# Patient Record
Sex: FEMALE | Race: WHITE | NOT HISPANIC OR LATINO | Employment: UNEMPLOYED | URBAN - METROPOLITAN AREA
[De-identification: names, ages, dates, MRNs, and addresses within clinical notes are randomized per-mention and may not be internally consistent; named-entity substitution may affect disease eponyms.]

---

## 2023-10-14 ENCOUNTER — HOSPITAL ENCOUNTER (EMERGENCY)
Facility: HOSPITAL | Age: 58
Discharge: HOME | End: 2023-10-14
Payer: COMMERCIAL

## 2023-10-14 VITALS
SYSTOLIC BLOOD PRESSURE: 159 MMHG | DIASTOLIC BLOOD PRESSURE: 97 MMHG | HEIGHT: 60 IN | TEMPERATURE: 97 F | OXYGEN SATURATION: 99 % | BODY MASS INDEX: 37.3 KG/M2 | WEIGHT: 190 LBS | RESPIRATION RATE: 20 BRPM | HEART RATE: 90 BPM

## 2023-10-14 DIAGNOSIS — T63.441A BEE STING, ACCIDENTAL OR UNINTENTIONAL, INITIAL ENCOUNTER: Primary | ICD-10-CM

## 2023-10-14 PROCEDURE — 99283 EMERGENCY DEPT VISIT LOW MDM: CPT

## 2023-10-14 RX ORDER — CETIRIZINE HYDROCHLORIDE 10 MG/1
10 TABLET ORAL DAILY
Qty: 10 TABLET | Refills: 0 | Status: SHIPPED | OUTPATIENT
Start: 2023-10-14 | End: 2023-10-24

## 2023-10-14 RX ORDER — CEPHALEXIN 500 MG/1
500 CAPSULE ORAL 4 TIMES DAILY
Qty: 28 CAPSULE | Refills: 0 | Status: SHIPPED | OUTPATIENT
Start: 2023-10-14 | End: 2023-10-21

## 2023-10-14 ASSESSMENT — COLUMBIA-SUICIDE SEVERITY RATING SCALE - C-SSRS
6. HAVE YOU EVER DONE ANYTHING, STARTED TO DO ANYTHING, OR PREPARED TO DO ANYTHING TO END YOUR LIFE?: NO
2. HAVE YOU ACTUALLY HAD ANY THOUGHTS OF KILLING YOURSELF?: NO
2. HAVE YOU ACTUALLY HAD ANY THOUGHTS OF KILLING YOURSELF?: NO
6. HAVE YOU EVER DONE ANYTHING, STARTED TO DO ANYTHING, OR PREPARED TO DO ANYTHING TO END YOUR LIFE?: NO
1. IN THE PAST MONTH, HAVE YOU WISHED YOU WERE DEAD OR WISHED YOU COULD GO TO SLEEP AND NOT WAKE UP?: NO
1. IN THE PAST MONTH, HAVE YOU WISHED YOU WERE DEAD OR WISHED YOU COULD GO TO SLEEP AND NOT WAKE UP?: NO

## 2023-10-15 NOTE — ED PROVIDER NOTES
Limitations to History: none  External Records Reviewed  Independent Historians: none  Social determinants affecting care: none    HPI  Chanel Vizcarra is a 58 y.o. female with history of hypertension who presents emergency department for assessment of a bee sting.  She reports that she was stung to her left breast.  She reports that she had immediate pain and redness to the area.  Reports that she became very anxious and came immediately to the ER.  She reports that she has had to bee stings in the past nebulous became cellulitic.  She reports that they region is very tender to the touch and very itchy.  She has not had any shortness of breath, tightness in her throat, difficulty swallowing secretions, nausea, vomiting, or headache.  She reports that she has an EpiPen at home but did not use this.  She did not try any over-the-counter medications for her symptoms.  She reports that she got very nervous and came to the ER for assessment.  Her son is at bedside.  She has no further complaints.    PMH  No past medical history on file. reviewed by myself.    Meds  Current Outpatient Medications   Medication Instructions    cephalexin (KEFLEX) 500 mg, oral, 4 times daily    cetirizine (ZYRTEC) 10 mg, oral, Daily       Allergies  No Known Allergies reviewed by myself.    SHx    reviewed by myself.      ------------------------------------------------------------------------------------------------------------------------------------------    BP (!) 159/97   Pulse 90   Temp 36.1 °C (97 °F)   Resp 20   Ht 1.524 m (5')   Wt 86.2 kg (190 lb)   SpO2 99%   BMI 37.11 kg/m²     Physical Exam  Vitals and nursing note reviewed.   Constitutional:       General: She is not in acute distress.     Appearance: Normal appearance. She is not toxic-appearing.   HENT:      Head: Normocephalic.      Nose: Nose normal.      Mouth/Throat:      Mouth: Mucous membranes are moist. No angioedema.      Pharynx: Oropharynx is clear. Uvula  midline. No pharyngeal swelling, posterior oropharyngeal erythema or uvula swelling.   Eyes:      Extraocular Movements: Extraocular movements intact.      Conjunctiva/sclera: Conjunctivae normal.   Cardiovascular:      Rate and Rhythm: Normal rate and regular rhythm.   Pulmonary:      Effort: Pulmonary effort is normal.      Breath sounds: Normal breath sounds.   Chest:      Comments: There is an insect sting to the inner region of the left breast.  There is no cellulitis or drainage.  Slight tenderness noted.  Musculoskeletal:         General: Normal range of motion.      Cervical back: Neck supple.   Skin:     General: Skin is warm and dry.   Neurological:      General: No focal deficit present.      Mental Status: She is alert and oriented to person, place, and time.   Psychiatric:         Attention and Perception: Attention normal.         Mood and Affect: Mood is anxious.          ------------------------------------------------------------------------------------------------------------------------------------------    No orders to display        Labs Reviewed - No data to display     Diagnoses as of 10/14/23 2240   Bee sting, accidental or unintentional, initial encounter        Medical Decision Making: She did not appear ill or toxic.  Vital signs reviewed.  She is hypertensive.  She is otherwise hemodynamically stable.  She is 99% on room air.  No signs of respiratory distress. No signs of anaphylaxis.  She does appear very anxious.  She is given concerned about her breast pain so an EKG was obtained.  I believe that her pain is likely from the bee sting.    EKG interpreted by myself: Normal sinus rhythm.  Ventricular rate 83 bpm.  PVC noted.  No acute ST elevations or depressions.    I discussed with the patient that I will prescribe her Zyrtec to take once daily for the bee sting.  I discussed with her to not itch the area as this can introduce infection.  She is very concerned about developing a  cellulitis so she was given a short course of Keflex to start if she develops any erythema, drainage or fevers.  She is to follow-up with her PCP in 2 to 3 days for reassessment.  She is to return to ER immediately if symptoms change or worsen.  She verbalized understanding and agreed to plan of care.  She was discharged home in stable condition.  EKG is not showing any acute signs of ischemia.  I believe that her breast pain is from the bee sting.    Diagnosis: Bee sting  Plan: discharge           Demario Teixeira PA-C  10/14/23 7634

## 2023-12-19 ENCOUNTER — HOSPITAL ENCOUNTER (OUTPATIENT)
Dept: CARDIOLOGY | Facility: HOSPITAL | Age: 58
Discharge: HOME | End: 2023-12-19
Payer: COMMERCIAL

## 2023-12-19 LAB
ATRIAL RATE: 83 BPM
P AXIS: 57 DEGREES
P OFFSET: 180 MS
P ONSET: 141 MS
PR INTERVAL: 160 MS
Q ONSET: 221 MS
QRS COUNT: 14 BEATS
QRS DURATION: 90 MS
QT INTERVAL: 456 MS
QTC CALCULATION(BAZETT): 535 MS
QTC FREDERICIA: 508 MS
R AXIS: 86 DEGREES
T AXIS: 89 DEGREES
T OFFSET: 449 MS
VENTRICULAR RATE: 83 BPM

## 2023-12-19 PROCEDURE — 93005 ELECTROCARDIOGRAM TRACING: CPT

## 2024-01-16 ENCOUNTER — OFFICE VISIT (OUTPATIENT)
Dept: PRIMARY CARE | Facility: CLINIC | Age: 59
End: 2024-01-16
Payer: COMMERCIAL

## 2024-01-16 VITALS
OXYGEN SATURATION: 98 % | DIASTOLIC BLOOD PRESSURE: 86 MMHG | HEIGHT: 60 IN | HEART RATE: 87 BPM | WEIGHT: 211 LBS | BODY MASS INDEX: 41.43 KG/M2 | SYSTOLIC BLOOD PRESSURE: 138 MMHG

## 2024-01-16 DIAGNOSIS — F43.29 POST-TRAUMA RESPONSE: Primary | ICD-10-CM

## 2024-01-16 DIAGNOSIS — F07.81 POST CONCUSSIVE SYNDROME: ICD-10-CM

## 2024-01-16 DIAGNOSIS — R41.3 MEMORY LOSS: ICD-10-CM

## 2024-01-16 DIAGNOSIS — G43.809 OTHER MIGRAINE WITHOUT STATUS MIGRAINOSUS, NOT INTRACTABLE: ICD-10-CM

## 2024-01-16 PROCEDURE — 99205 OFFICE O/P NEW HI 60 MIN: CPT | Performed by: INTERNAL MEDICINE

## 2024-01-16 RX ORDER — LOSARTAN POTASSIUM 50 MG/1
50 TABLET ORAL DAILY
COMMUNITY

## 2024-01-16 RX ORDER — LORAZEPAM 0.5 MG/1
0.5 TABLET ORAL EVERY 6 HOURS PRN
COMMUNITY

## 2024-01-16 RX ORDER — PANTOPRAZOLE SODIUM 40 MG/1
TABLET, DELAYED RELEASE ORAL
COMMUNITY
Start: 2023-11-09

## 2024-01-16 RX ORDER — MELOXICAM 15 MG/1
TABLET ORAL
COMMUNITY
Start: 2023-10-04

## 2024-01-16 RX ORDER — ALBUTEROL SULFATE 90 UG/1
AEROSOL, METERED RESPIRATORY (INHALATION)
COMMUNITY
Start: 2023-11-09

## 2024-01-16 RX ORDER — ROSUVASTATIN CALCIUM 10 MG/1
TABLET, COATED ORAL
COMMUNITY
Start: 2023-11-09

## 2024-01-16 RX ORDER — METHYLPREDNISOLONE 4 MG/1
TABLET ORAL
Qty: 21 TABLET | Refills: 2 | Status: SHIPPED | OUTPATIENT
Start: 2024-01-16 | End: 2024-01-23
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
## 2024-01-22 ENCOUNTER — OFFICE VISIT (OUTPATIENT)
Dept: BEHAVIORAL HEALTH | Facility: CLINIC | Age: 59
End: 2024-01-22
Payer: COMMERCIAL

## 2024-01-22 DIAGNOSIS — F43.29 POST-TRAUMA RESPONSE: ICD-10-CM

## 2024-01-22 DIAGNOSIS — F41.1 GAD (GENERALIZED ANXIETY DISORDER): ICD-10-CM

## 2024-01-22 PROCEDURE — 99205 OFFICE O/P NEW HI 60 MIN: CPT

## 2024-01-22 ASSESSMENT — ENCOUNTER SYMPTOMS
CONSTITUTIONAL NEGATIVE: 1
ENDOCRINE NEGATIVE: 1
EYES NEGATIVE: 1
RESPIRATORY NEGATIVE: 1
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
## 2024-01-31 RX ORDER — ESCITALOPRAM OXALATE 10 MG/1
5 TABLET ORAL DAILY
Qty: 30 TABLET | Refills: 0 | Status: SHIPPED
Start: 2024-01-31 | End: 2024-03-19 | Stop reason: ALTCHOICE

## 2024-01-31 ASSESSMENT — PATIENT HEALTH QUESTIONNAIRE - PHQ9
1. LITTLE INTEREST OR PLEASURE IN DOING THINGS: MORE THAN HALF THE DAYS
9. THOUGHTS THAT YOU WOULD BE BETTER OFF DEAD, OR OF HURTING YOURSELF: NOT AT ALL
7. TROUBLE CONCENTRATING ON THINGS, SUCH AS READING THE NEWSPAPER OR WATCHING TELEVISION: SEVERAL DAYS
5. POOR APPETITE OR OVEREATING: SEVERAL DAYS
3. TROUBLE FALLING OR STAYING ASLEEP OR SLEEPING TOO MUCH: NOT AT ALL
4. FEELING TIRED OR HAVING LITTLE ENERGY: SEVERAL DAYS
6. FEELING BAD ABOUT YOURSELF - OR THAT YOU ARE A FAILURE OR HAVE LET YOURSELF OR YOUR FAMILY DOWN: SEVERAL DAYS
2. FEELING DOWN, DEPRESSED OR HOPELESS: SEVERAL DAYS
8. MOVING OR SPEAKING SO SLOWLY THAT OTHER PEOPLE COULD HAVE NOTICED. OR THE OPPOSITE, BEING SO FIGETY OR RESTLESS THAT YOU HAVE BEEN MOVING AROUND A LOT MORE THAN USUAL: NOT AT ALL

## 2024-02-13 ENCOUNTER — APPOINTMENT (OUTPATIENT)
Dept: PRIMARY CARE | Facility: CLINIC | Age: 59
End: 2024-02-13
Payer: COMMERCIAL

## 2024-03-01 ENCOUNTER — APPOINTMENT (OUTPATIENT)
Dept: PRIMARY CARE | Facility: CLINIC | Age: 59
End: 2024-03-01
Payer: COMMERCIAL

## 2024-03-19 ENCOUNTER — OFFICE VISIT (OUTPATIENT)
Dept: PRIMARY CARE | Facility: CLINIC | Age: 59
End: 2024-03-19
Payer: COMMERCIAL

## 2024-03-19 VITALS
WEIGHT: 211 LBS | OXYGEN SATURATION: 95 % | SYSTOLIC BLOOD PRESSURE: 134 MMHG | HEIGHT: 60 IN | HEART RATE: 93 BPM | DIASTOLIC BLOOD PRESSURE: 80 MMHG | BODY MASS INDEX: 41.43 KG/M2

## 2024-03-19 DIAGNOSIS — F07.81 POST CONCUSSIVE SYNDROME: Primary | ICD-10-CM

## 2024-03-19 PROCEDURE — 1036F TOBACCO NON-USER: CPT | Performed by: INTERNAL MEDICINE

## 2024-03-19 PROCEDURE — 99214 OFFICE O/P EST MOD 30 MIN: CPT | Performed by: INTERNAL MEDICINE

## 2024-03-19 ASSESSMENT — PATIENT HEALTH QUESTIONNAIRE - PHQ9
2. FEELING DOWN, DEPRESSED OR HOPELESS: NOT AT ALL
SUM OF ALL RESPONSES TO PHQ9 QUESTIONS 1 AND 2: 0
1. LITTLE INTEREST OR PLEASURE IN DOING THINGS: NOT AT ALL
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
## 2024-04-18 ENCOUNTER — TELEMEDICINE (OUTPATIENT)
Dept: PRIMARY CARE | Facility: CLINIC | Age: 59
End: 2024-04-18
Payer: COMMERCIAL

## 2024-04-18 VITALS
DIASTOLIC BLOOD PRESSURE: 74 MMHG | BODY MASS INDEX: 41.43 KG/M2 | SYSTOLIC BLOOD PRESSURE: 127 MMHG | WEIGHT: 211 LBS | HEIGHT: 60 IN

## 2024-04-18 DIAGNOSIS — A08.4 GASTROENTERITIS AND COLITIS, VIRAL: Primary | ICD-10-CM

## 2024-04-18 PROCEDURE — 99441 PR PHYS/QHP TELEPHONE EVALUATION 5-10 MIN: CPT | Performed by: INTERNAL MEDICINE

## 2024-04-18 PROCEDURE — 1036F TOBACCO NON-USER: CPT | Performed by: INTERNAL MEDICINE

## 2024-04-18 ASSESSMENT — PATIENT HEALTH QUESTIONNAIRE - PHQ9
SUM OF ALL RESPONSES TO PHQ9 QUESTIONS 1 AND 2: 0
1. LITTLE INTEREST OR PLEASURE IN DOING THINGS: NOT AT ALL
2. FEELING DOWN, DEPRESSED OR HOPELESS: NOT AT ALL
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
## 2024-04-23 ENCOUNTER — LAB (OUTPATIENT)
Dept: LAB | Facility: LAB | Age: 59
End: 2024-04-23
Payer: COMMERCIAL

## 2024-04-23 DIAGNOSIS — I10 ESSENTIAL (PRIMARY) HYPERTENSION: ICD-10-CM

## 2024-04-23 DIAGNOSIS — Z00.00 ENCOUNTER FOR GENERAL ADULT MEDICAL EXAMINATION WITHOUT ABNORMAL FINDINGS: Primary | ICD-10-CM

## 2024-04-23 DIAGNOSIS — E78.2 MIXED HYPERLIPIDEMIA: ICD-10-CM

## 2024-04-23 LAB
ALBUMIN SERPL BCP-MCNC: 4.5 G/DL (ref 3.4–5)
ALP SERPL-CCNC: 79 U/L (ref 33–110)
ALT SERPL W P-5'-P-CCNC: 27 U/L (ref 7–45)
ANION GAP SERPL CALC-SCNC: 12 MMOL/L (ref 10–20)
AST SERPL W P-5'-P-CCNC: 21 U/L (ref 9–39)
BASOPHILS # BLD AUTO: 0.08 X10*3/UL (ref 0–0.1)
BASOPHILS NFR BLD AUTO: 0.8 %
BILIRUB SERPL-MCNC: 0.4 MG/DL (ref 0–1.2)
BUN SERPL-MCNC: 15 MG/DL (ref 6–23)
CALCIUM SERPL-MCNC: 9.6 MG/DL (ref 8.6–10.6)
CHLORIDE SERPL-SCNC: 106 MMOL/L (ref 98–107)
CHOLEST SERPL-MCNC: 252 MG/DL (ref 0–199)
CHOLESTEROL/HDL RATIO: 5
CO2 SERPL-SCNC: 27 MMOL/L (ref 21–32)
CREAT SERPL-MCNC: 0.82 MG/DL (ref 0.5–1.05)
EGFRCR SERPLBLD CKD-EPI 2021: 84 ML/MIN/1.73M*2
EOSINOPHIL # BLD AUTO: 0.19 X10*3/UL (ref 0–0.7)
EOSINOPHIL NFR BLD AUTO: 2 %
ERYTHROCYTE [DISTWIDTH] IN BLOOD BY AUTOMATED COUNT: 14.6 % (ref 11.5–14.5)
GLUCOSE SERPL-MCNC: 109 MG/DL (ref 74–99)
HCT VFR BLD AUTO: 44.2 % (ref 36–46)
HDLC SERPL-MCNC: 50.8 MG/DL
HGB BLD-MCNC: 13.7 G/DL (ref 12–16)
IMM GRANULOCYTES # BLD AUTO: 0.16 X10*3/UL (ref 0–0.7)
IMM GRANULOCYTES NFR BLD AUTO: 1.7 % (ref 0–0.9)
LDLC SERPL CALC-MCNC: 150 MG/DL
LYMPHOCYTES # BLD AUTO: 3.36 X10*3/UL (ref 1.2–4.8)
LYMPHOCYTES NFR BLD AUTO: 34.9 %
MCH RBC QN AUTO: 27.7 PG (ref 26–34)
MCHC RBC AUTO-ENTMCNC: 31 G/DL (ref 32–36)
MCV RBC AUTO: 90 FL (ref 80–100)
MONOCYTES # BLD AUTO: 0.66 X10*3/UL (ref 0.1–1)
MONOCYTES NFR BLD AUTO: 6.9 %
NEUTROPHILS # BLD AUTO: 5.18 X10*3/UL (ref 1.2–7.7)
NEUTROPHILS NFR BLD AUTO: 53.7 %
NON HDL CHOLESTEROL: 201 MG/DL (ref 0–149)
NRBC BLD-RTO: 0 /100 WBCS (ref 0–0)
PLATELET # BLD AUTO: 395 X10*3/UL (ref 150–450)
POTASSIUM SERPL-SCNC: 4 MMOL/L (ref 3.5–5.3)
PROT SERPL-MCNC: 7 G/DL (ref 6.4–8.2)
RBC # BLD AUTO: 4.94 X10*6/UL (ref 4–5.2)
SODIUM SERPL-SCNC: 141 MMOL/L (ref 136–145)
TRIGL SERPL-MCNC: 254 MG/DL (ref 0–149)
VLDL: 51 MG/DL (ref 0–40)
WBC # BLD AUTO: 9.6 X10*3/UL (ref 4.4–11.3)

## 2024-04-23 PROCEDURE — 80061 LIPID PANEL: CPT

## 2024-04-23 PROCEDURE — 85025 COMPLETE CBC W/AUTO DIFF WBC: CPT

## 2024-04-23 PROCEDURE — 36415 COLL VENOUS BLD VENIPUNCTURE: CPT

## 2024-04-23 PROCEDURE — 80053 COMPREHEN METABOLIC PANEL: CPT

## 2024-05-13 ENCOUNTER — APPOINTMENT (OUTPATIENT)
Dept: RADIOLOGY | Facility: HOSPITAL | Age: 59
End: 2024-05-13
Payer: COMMERCIAL

## 2024-05-13 ENCOUNTER — APPOINTMENT (OUTPATIENT)
Dept: CARDIOLOGY | Facility: HOSPITAL | Age: 59
End: 2024-05-13
Payer: COMMERCIAL

## 2024-05-13 ENCOUNTER — HOSPITAL ENCOUNTER (EMERGENCY)
Facility: HOSPITAL | Age: 59
Discharge: HOME | End: 2024-05-13
Attending: EMERGENCY MEDICINE
Payer: COMMERCIAL

## 2024-05-13 VITALS
DIASTOLIC BLOOD PRESSURE: 76 MMHG | SYSTOLIC BLOOD PRESSURE: 149 MMHG | WEIGHT: 200 LBS | TEMPERATURE: 97.5 F | BODY MASS INDEX: 39.27 KG/M2 | HEIGHT: 60 IN | HEART RATE: 76 BPM | OXYGEN SATURATION: 97 % | RESPIRATION RATE: 18 BRPM

## 2024-05-13 DIAGNOSIS — R07.9 CHEST PAIN, UNSPECIFIED TYPE: Primary | ICD-10-CM

## 2024-05-13 LAB
ALBUMIN SERPL BCP-MCNC: 4.2 G/DL (ref 3.4–5)
ALP SERPL-CCNC: 77 U/L (ref 33–110)
ALT SERPL W P-5'-P-CCNC: 26 U/L (ref 7–45)
ANION GAP SERPL CALC-SCNC: 13 MMOL/L (ref 10–20)
AST SERPL W P-5'-P-CCNC: 18 U/L (ref 9–39)
BASOPHILS # BLD AUTO: 0.07 X10*3/UL (ref 0–0.1)
BASOPHILS NFR BLD AUTO: 0.7 %
BILIRUB SERPL-MCNC: 0.4 MG/DL (ref 0–1.2)
BUN SERPL-MCNC: 14 MG/DL (ref 6–23)
CALCIUM SERPL-MCNC: 9.2 MG/DL (ref 8.6–10.3)
CARDIAC TROPONIN I PNL SERPL HS: 10 NG/L (ref 0–13)
CARDIAC TROPONIN I PNL SERPL HS: 9 NG/L (ref 0–13)
CHLORIDE SERPL-SCNC: 104 MMOL/L (ref 98–107)
CO2 SERPL-SCNC: 26 MMOL/L (ref 21–32)
CREAT SERPL-MCNC: 0.94 MG/DL (ref 0.5–1.05)
EGFRCR SERPLBLD CKD-EPI 2021: 71 ML/MIN/1.73M*2
EOSINOPHIL # BLD AUTO: 0.26 X10*3/UL (ref 0–0.7)
EOSINOPHIL NFR BLD AUTO: 2.8 %
ERYTHROCYTE [DISTWIDTH] IN BLOOD BY AUTOMATED COUNT: 14 % (ref 11.5–14.5)
GLUCOSE SERPL-MCNC: 138 MG/DL (ref 74–99)
HCT VFR BLD AUTO: 42.1 % (ref 36–46)
HGB BLD-MCNC: 13.5 G/DL (ref 12–16)
IMM GRANULOCYTES # BLD AUTO: 0.1 X10*3/UL (ref 0–0.7)
IMM GRANULOCYTES NFR BLD AUTO: 1.1 % (ref 0–0.9)
LYMPHOCYTES # BLD AUTO: 3.89 X10*3/UL (ref 1.2–4.8)
LYMPHOCYTES NFR BLD AUTO: 41.5 %
MCH RBC QN AUTO: 28.2 PG (ref 26–34)
MCHC RBC AUTO-ENTMCNC: 32.1 G/DL (ref 32–36)
MCV RBC AUTO: 88 FL (ref 80–100)
MONOCYTES # BLD AUTO: 0.59 X10*3/UL (ref 0.1–1)
MONOCYTES NFR BLD AUTO: 6.3 %
NEUTROPHILS # BLD AUTO: 4.46 X10*3/UL (ref 1.2–7.7)
NEUTROPHILS NFR BLD AUTO: 47.6 %
NRBC BLD-RTO: 0 /100 WBCS (ref 0–0)
PLATELET # BLD AUTO: 373 X10*3/UL (ref 150–450)
POTASSIUM SERPL-SCNC: 3.7 MMOL/L (ref 3.5–5.3)
PROT SERPL-MCNC: 6.9 G/DL (ref 6.4–8.2)
RBC # BLD AUTO: 4.78 X10*6/UL (ref 4–5.2)
SODIUM SERPL-SCNC: 139 MMOL/L (ref 136–145)
WBC # BLD AUTO: 9.4 X10*3/UL (ref 4.4–11.3)

## 2024-05-13 PROCEDURE — 36415 COLL VENOUS BLD VENIPUNCTURE: CPT | Performed by: EMERGENCY MEDICINE

## 2024-05-13 PROCEDURE — 93005 ELECTROCARDIOGRAM TRACING: CPT | Mod: 59

## 2024-05-13 PROCEDURE — 84075 ASSAY ALKALINE PHOSPHATASE: CPT | Performed by: EMERGENCY MEDICINE

## 2024-05-13 PROCEDURE — 99283 EMERGENCY DEPT VISIT LOW MDM: CPT | Mod: 25

## 2024-05-13 PROCEDURE — 85025 COMPLETE CBC W/AUTO DIFF WBC: CPT | Performed by: EMERGENCY MEDICINE

## 2024-05-13 PROCEDURE — 71045 X-RAY EXAM CHEST 1 VIEW: CPT

## 2024-05-13 PROCEDURE — 71045 X-RAY EXAM CHEST 1 VIEW: CPT | Performed by: RADIOLOGY

## 2024-05-13 PROCEDURE — 84484 ASSAY OF TROPONIN QUANT: CPT | Performed by: EMERGENCY MEDICINE

## 2024-05-13 ASSESSMENT — PAIN SCALES - GENERAL
PAINLEVEL_OUTOF10: 0 - NO PAIN
PAINLEVEL_OUTOF10: 3

## 2024-05-13 ASSESSMENT — PAIN - FUNCTIONAL ASSESSMENT
PAIN_FUNCTIONAL_ASSESSMENT: 0-10
PAIN_FUNCTIONAL_ASSESSMENT: 0-10

## 2024-05-13 ASSESSMENT — LIFESTYLE VARIABLES
EVER HAD A DRINK FIRST THING IN THE MORNING TO STEADY YOUR NERVES TO GET RID OF A HANGOVER: NO
TOTAL SCORE: 0
EVER FELT BAD OR GUILTY ABOUT YOUR DRINKING: NO
HAVE YOU EVER FELT YOU SHOULD CUT DOWN ON YOUR DRINKING: NO
HAVE PEOPLE ANNOYED YOU BY CRITICIZING YOUR DRINKING: NO

## 2024-05-13 ASSESSMENT — PAIN DESCRIPTION - LOCATION: LOCATION: CHEST

## 2024-05-13 ASSESSMENT — PAIN DESCRIPTION - PAIN TYPE: TYPE: ACUTE PAIN

## 2024-05-13 ASSESSMENT — COLUMBIA-SUICIDE SEVERITY RATING SCALE - C-SSRS
1. IN THE PAST MONTH, HAVE YOU WISHED YOU WERE DEAD OR WISHED YOU COULD GO TO SLEEP AND NOT WAKE UP?: NO
6. HAVE YOU EVER DONE ANYTHING, STARTED TO DO ANYTHING, OR PREPARED TO DO ANYTHING TO END YOUR LIFE?: NO
2. HAVE YOU ACTUALLY HAD ANY THOUGHTS OF KILLING YOURSELF?: NO

## 2024-05-13 NOTE — ED PROVIDER NOTES
HPI   Chief Complaint   Patient presents with    Chest Pain     Left arm tingling, started last night        Patient presents for chest discomfort is been going on for approximately 12 hours.  It is intermittent and never lasts more than a minute.  She states it feels like a warmth or squeezing also may be sometimes a burning as well.  And then it will completely go away.  Is not exertional and is not pleuritic.  She has no other associated symptoms.  She does have a history of high blood pressure and hyperlipidemia and obesity.                          Collette Coma Scale Score: 15                     Patient History   No past medical history on file.  No past surgical history on file.  Family History   Problem Relation Name Age of Onset    Other (Suicide) Cousin       Social History     Tobacco Use    Smoking status: Never    Smokeless tobacco: Never   Substance Use Topics    Alcohol use: Not Currently    Drug use: Never       Physical Exam   ED Triage Vitals [05/13/24 0318]   Temperature Heart Rate Respirations BP   36.1 °C (97 °F) 84 16 (!) 178/108      Pulse Ox Temp src Heart Rate Source Patient Position   98 % -- -- --      BP Location FiO2 (%)     -- --       Physical Exam  Vitals and nursing note reviewed.   Constitutional:       General: She is not in acute distress.     Appearance: She is well-developed.   HENT:      Head: Normocephalic and atraumatic.   Eyes:      Conjunctiva/sclera: Conjunctivae normal.   Cardiovascular:      Rate and Rhythm: Normal rate and regular rhythm.      Heart sounds: No murmur heard.  Pulmonary:      Effort: Pulmonary effort is normal. No respiratory distress.      Breath sounds: Normal breath sounds.   Abdominal:      Palpations: Abdomen is soft.      Tenderness: There is no abdominal tenderness.   Musculoskeletal:         General: No swelling.      Cervical back: Neck supple.   Skin:     General: Skin is warm and dry.      Capillary Refill: Capillary refill takes less than 2  seconds.   Neurological:      Mental Status: She is alert.   Psychiatric:         Mood and Affect: Mood normal.         ED Course & MDM   Diagnoses as of 05/13/24 2124   Chest pain, unspecified type       Medical Decision Making  Twelve-lead EKG is normal sinus rhythm with a rate of 90.  Nonspecific ST-T wave changes.  No ischemia or injury pattern.  It was interpreted by emergency physician.    Patient had troponins that were negative x 2.  Chest x-ray is unremarkable.  The likelihood of acute coronary syndrome, aortic dissection or pulmonary embolism was extremely low.  Patient is currently asymptomatic.  She will be discharged.  Prior medical records were reviewed.        Procedure  Procedures     Zeke Coffey MD  05/13/24 2125

## 2024-05-22 LAB
ATRIAL RATE: 75 BPM
ATRIAL RATE: 89 BPM
P AXIS: 33 DEGREES
P AXIS: 52 DEGREES
P OFFSET: 189 MS
P ONSET: 143 MS
PR INTERVAL: 146 MS
PR INTERVAL: 164 MS
Q ONSET: 225 MS
Q ONSET: 251 MS
QRS COUNT: 12 BEATS
QRS COUNT: 14 BEATS
QRS DURATION: 102 MS
QRS DURATION: 92 MS
QT INTERVAL: 388 MS
QT INTERVAL: 392 MS
QTC CALCULATION(BAZETT): 437 MS
QTC CALCULATION(BAZETT): 475 MS
QTC FREDERICIA: 422 MS
QTC FREDERICIA: 444 MS
R AXIS: 59 DEGREES
R AXIS: 83 DEGREES
T AXIS: 100 DEGREES
T AXIS: 3 DEGREES
T OFFSET: 421 MS
T OFFSET: 445 MS
VENTRICULAR RATE: 75 BPM
VENTRICULAR RATE: 90 BPM

## 2024-06-21 ENCOUNTER — TELEMEDICINE (OUTPATIENT)
Dept: PRIMARY CARE | Facility: CLINIC | Age: 59
End: 2024-06-21
Payer: COMMERCIAL

## 2024-06-21 DIAGNOSIS — R07.9 CHEST PAIN, UNSPECIFIED TYPE: ICD-10-CM

## 2024-06-21 DIAGNOSIS — L98.9 SKIN LESION: ICD-10-CM

## 2024-06-21 DIAGNOSIS — Z00.00 HEALTHCARE MAINTENANCE: Primary | ICD-10-CM

## 2024-06-21 DIAGNOSIS — Z12.11 COLON CANCER SCREENING: ICD-10-CM

## 2024-06-21 PROCEDURE — 99442 PR PHYS/QHP TELEPHONE EVALUATION 11-20 MIN: CPT | Performed by: INTERNAL MEDICINE

## 2024-06-21 PROCEDURE — 1036F TOBACCO NON-USER: CPT | Performed by: INTERNAL MEDICINE
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
## 2024-07-17 ENCOUNTER — HOSPITAL ENCOUNTER (EMERGENCY)
Facility: HOSPITAL | Age: 59
Discharge: HOME | End: 2024-07-17
Payer: COMMERCIAL

## 2024-07-17 VITALS
RESPIRATION RATE: 20 BRPM | TEMPERATURE: 97.7 F | OXYGEN SATURATION: 99 % | HEART RATE: 75 BPM | WEIGHT: 200 LBS | HEIGHT: 60 IN | SYSTOLIC BLOOD PRESSURE: 169 MMHG | DIASTOLIC BLOOD PRESSURE: 100 MMHG | BODY MASS INDEX: 39.27 KG/M2

## 2024-07-17 DIAGNOSIS — T16.2XXA FOREIGN BODY OF LEFT EAR, INITIAL ENCOUNTER: Primary | ICD-10-CM

## 2024-07-17 PROCEDURE — 99282 EMERGENCY DEPT VISIT SF MDM: CPT | Mod: 25

## 2024-07-17 PROCEDURE — 69200 CLEAR OUTER EAR CANAL: CPT | Mod: LT

## 2024-07-17 ASSESSMENT — LIFESTYLE VARIABLES
HAVE YOU EVER FELT YOU SHOULD CUT DOWN ON YOUR DRINKING: NO
EVER HAD A DRINK FIRST THING IN THE MORNING TO STEADY YOUR NERVES TO GET RID OF A HANGOVER: NO
EVER FELT BAD OR GUILTY ABOUT YOUR DRINKING: NO
TOTAL SCORE: 0
HAVE PEOPLE ANNOYED YOU BY CRITICIZING YOUR DRINKING: NO

## 2024-07-17 ASSESSMENT — COLUMBIA-SUICIDE SEVERITY RATING SCALE - C-SSRS
6. HAVE YOU EVER DONE ANYTHING, STARTED TO DO ANYTHING, OR PREPARED TO DO ANYTHING TO END YOUR LIFE?: NO
1. IN THE PAST MONTH, HAVE YOU WISHED YOU WERE DEAD OR WISHED YOU COULD GO TO SLEEP AND NOT WAKE UP?: NO
2. HAVE YOU ACTUALLY HAD ANY THOUGHTS OF KILLING YOURSELF?: NO

## 2024-07-17 NOTE — ED PROVIDER NOTES
Limitations to History: None     HPI:      Chanel Vizcarra is a 58 y.o. with significant past medical history for hypertension presented to ED today from home with spouse for removal of foreign body left EAC.  Just prior to arrival, the patient was using an off brand of Q-tips, she feels some of the cotton is stuck in the ear canal.  Reports muffled hearing and uncomfortable feeling.  Denies fever/chills, cough/cold symptoms, chest pain, shortness of breath, nausea/vomiting, abdominal pain, urinary symptoms, change in bowel habits or any other complaints.  No smoking, EtOH or drug use.  PCP is Dr. Quiroga.     Additional History Obtained from: None    ------------------------------------------------------------------------------------------------------------------------------------------    VS: As documented in the triage note and EMR flowsheet from this visit were reviewed.    Physical Exam:  Gen: 58-year-old  female, awake and alert, oriented x 3.  Well-nourished and hydrated.  No apparent distress.  Head/Neck: NCAT, neck w/ FROM  Eyes: EOMI, PERRL, anicteric sclerae, noninjected conjunctivae  Ears: Foreign body left EAC, right TM clear b/l without sign of infection  Nose: Nares patent w/o rhinorrhea  Mouth:  MMM, no OP lesions noted  Neurologic: Alert, symmetrical facies, phonates clearly, moves all extremities equally, responsive to touch, ambulates normally   Skin: Pink, warm and dry.  No rashes noted        ------------------------------------------------------------------------------------------------------------------------------------------    Medical Decision Makin-year-old female with known hypertension is evaluated the bedside for cotton that is stuck in the left EAC after attempting to clean areas prior to arrival.  On arrival to the ED, blood pressure slightly elevated 195/85, this will be reevaluated.  Patient is not tachycardic, hypoxic or febrile.  Cotton visible in the left EAC.       1044: Easily removed with alligator forceps.  Left TM intact with visible landmarks.  No evidence of infection or trauma.  Repeat vital signs within normal limits.  Feels improved.  Discharge home, follow-up with PCP as needed.  Counseled on putting things inside her ears.  Return precautions discussed.  Diagnosis, treatment and plan discussed with patient, she verbalizes understanding and is in agreement.  Condition stable for discharge.    Diagnoses as of 07/17/24 1021   Foreign body of left ear, initial encounter       EKG interpreted by myself (ED attending physician): Not ordered    Chronic Medical Conditions Significantly Affecting Care: None     External Records Reviewed: I reviewed recent and relevant outside records including: None    Discussion of Management with Other Providers: None    I discussed the patient/results with: None       YUE Ramey-SUMMER  07/17/24 1046

## 2024-07-17 NOTE — DISCHARGE INSTRUCTIONS
Cotton was successfully removed from your left external ear canal.  You achieved relief of symptoms.  Avoid putting things in your ears.  Follow-up with PCP as needed.

## 2024-07-23 ENCOUNTER — HOSPITAL ENCOUNTER (OUTPATIENT)
Dept: CARDIOLOGY | Facility: HOSPITAL | Age: 59
Discharge: HOME | End: 2024-07-23
Payer: COMMERCIAL

## 2024-07-23 VITALS — HEIGHT: 60 IN | BODY MASS INDEX: 39.27 KG/M2 | WEIGHT: 200 LBS

## 2024-07-23 DIAGNOSIS — R07.9 CHEST PAIN, UNSPECIFIED TYPE: ICD-10-CM

## 2024-07-23 LAB
AORTIC VALVE MEAN GRADIENT: 5 MMHG
AORTIC VALVE PEAK VELOCITY: 1.45 M/S
AV PEAK GRADIENT: 8.4 MMHG
AVA (PEAK VEL): 2.04 CM2
AVA (VTI): 2.1 CM2
EJECTION FRACTION APICAL 4 CHAMBER: 54.7
EJECTION FRACTION: 56 %
LEFT ATRIUM VOLUME AREA LENGTH INDEX BSA: 19.5 ML/M2
LEFT VENTRICLE INTERNAL DIMENSION DIASTOLE: 3.6 CM (ref 3.5–6)
LEFT VENTRICULAR OUTFLOW TRACT DIAMETER: 2 CM
MITRAL VALVE E/E' RATIO: 11.9
RIGHT VENTRICLE FREE WALL PEAK S': 10.9 CM/S
TRICUSPID ANNULAR PLANE SYSTOLIC EXCURSION: 1.8 CM

## 2024-07-23 PROCEDURE — 93306 TTE W/DOPPLER COMPLETE: CPT

## 2024-07-23 PROCEDURE — 93306 TTE W/DOPPLER COMPLETE: CPT | Performed by: INTERNAL MEDICINE

## 2024-07-23 PROCEDURE — 93016 CV STRESS TEST SUPVJ ONLY: CPT | Performed by: INTERNAL MEDICINE

## 2024-07-23 PROCEDURE — 93017 CV STRESS TEST TRACING ONLY: CPT

## 2024-07-23 PROCEDURE — 93018 CV STRESS TEST I&R ONLY: CPT | Performed by: INTERNAL MEDICINE

## 2024-07-29 ENCOUNTER — APPOINTMENT (OUTPATIENT)
Dept: PRIMARY CARE | Facility: CLINIC | Age: 59
End: 2024-07-29
Payer: COMMERCIAL

## 2024-07-29 VITALS — WEIGHT: 214 LBS | SYSTOLIC BLOOD PRESSURE: 132 MMHG | DIASTOLIC BLOOD PRESSURE: 88 MMHG | BODY MASS INDEX: 41.79 KG/M2

## 2024-07-29 DIAGNOSIS — K21.9 GASTROESOPHAGEAL REFLUX DISEASE, UNSPECIFIED WHETHER ESOPHAGITIS PRESENT: ICD-10-CM

## 2024-07-29 DIAGNOSIS — Z00.00 HEALTHCARE MAINTENANCE: ICD-10-CM

## 2024-07-29 DIAGNOSIS — Z97.5 IUD (INTRAUTERINE DEVICE) IN PLACE: ICD-10-CM

## 2024-07-29 DIAGNOSIS — Z12.31 VISIT FOR SCREENING MAMMOGRAM: Primary | ICD-10-CM

## 2024-07-29 DIAGNOSIS — I10 PRIMARY HYPERTENSION: ICD-10-CM

## 2024-07-29 PROCEDURE — 99215 OFFICE O/P EST HI 40 MIN: CPT | Performed by: INTERNAL MEDICINE

## 2024-07-29 PROCEDURE — 3075F SYST BP GE 130 - 139MM HG: CPT | Performed by: INTERNAL MEDICINE

## 2024-07-29 PROCEDURE — 1036F TOBACCO NON-USER: CPT | Performed by: INTERNAL MEDICINE

## 2024-07-29 PROCEDURE — 3079F DIAST BP 80-89 MM HG: CPT | Performed by: INTERNAL MEDICINE

## 2024-07-29 RX ORDER — PANTOPRAZOLE SODIUM 40 MG/1
40 TABLET, DELAYED RELEASE ORAL
Qty: 90 TABLET | Refills: 1 | Status: SHIPPED | OUTPATIENT
Start: 2024-07-29 | End: 2024-10-27

## 2024-07-29 RX ORDER — LOSARTAN POTASSIUM 50 MG/1
100 TABLET ORAL DAILY
Qty: 90 TABLET | Refills: 1 | Status: SHIPPED | OUTPATIENT
Start: 2024-07-29
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
## 2024-08-02 ENCOUNTER — TELEPHONE (OUTPATIENT)
Dept: PRIMARY CARE | Facility: CLINIC | Age: 59
End: 2024-08-02
Payer: COMMERCIAL

## 2024-08-02 ENCOUNTER — DOCUMENTATION (OUTPATIENT)
Dept: PRIMARY CARE | Facility: CLINIC | Age: 59
End: 2024-08-02
Payer: COMMERCIAL

## 2024-08-02 NOTE — PROGRESS NOTES
Chanel  I am not clear why you would need a work letter claiming inability to work based on your last appointment  , you seem to be doing reasonably well and this was not discussed.  Please advise on what your symptoms are that you believe make it so you cannot work at this time, and then it will be decided if a letter stating you cannot work is medically indicated and or if you need other referrals placed the specialist so that he can get back to feeling her best  Also if you have further questions please schedule another office follow-up with me in the next 2 weeks  Thank you      Radha Gaffney routed conversation to You5 hours ago (11:59 AM)     Radha Gaffney5 hours ago (11:59 AM)     LM  Pt would like letter stating she cannot work until further notice         Note        Chanel Vizcarra [phone]  Radha Gaffney5 hours ago (11:58 AM)

## 2024-08-05 ENCOUNTER — APPOINTMENT (OUTPATIENT)
Dept: CARDIOLOGY | Facility: CLINIC | Age: 59
End: 2024-08-05
Payer: COMMERCIAL

## 2024-08-28 ENCOUNTER — TELEMEDICINE (OUTPATIENT)
Dept: PRIMARY CARE | Facility: CLINIC | Age: 59
End: 2024-08-28
Payer: COMMERCIAL

## 2024-08-28 VITALS — WEIGHT: 214 LBS | BODY MASS INDEX: 41.79 KG/M2

## 2024-08-28 DIAGNOSIS — R41.3 MEMORY LOSS: ICD-10-CM

## 2024-08-28 DIAGNOSIS — F07.81 POST CONCUSSION SYNDROME: ICD-10-CM

## 2024-08-28 DIAGNOSIS — R47.9 SPEECH DISTURBANCE, UNSPECIFIED TYPE: Primary | ICD-10-CM

## 2024-08-28 DIAGNOSIS — F41.9 ANXIETY: ICD-10-CM

## 2024-08-28 DIAGNOSIS — R42 VERTIGO: ICD-10-CM

## 2024-08-28 PROCEDURE — 1036F TOBACCO NON-USER: CPT | Performed by: INTERNAL MEDICINE

## 2024-08-28 PROCEDURE — 99442 PR PHYS/QHP TELEPHONE EVALUATION 11-20 MIN: CPT | Performed by: INTERNAL MEDICINE
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
## 2024-08-28 NOTE — LETTER
August 28, 2024     Patient: Chanel Vizcarra   YOB: 1965   Date of Visit:        To Whom It May Concern:    Chanel Vizcarra was seen in my clinic on 8/28/2024 at 3:30 pm. Patient is unable to work at this time until further notice for medical reasons.       Sincerely,         Juve Quiroga,         CC: No Recipients

## 2024-09-12 ENCOUNTER — HOSPITAL ENCOUNTER (OUTPATIENT)
Dept: RADIOLOGY | Facility: CLINIC | Age: 59
Discharge: HOME | End: 2024-09-12
Payer: COMMERCIAL

## 2024-09-12 VITALS — HEIGHT: 60 IN | WEIGHT: 214.07 LBS | BODY MASS INDEX: 42.03 KG/M2

## 2024-09-12 DIAGNOSIS — Z12.31 VISIT FOR SCREENING MAMMOGRAM: ICD-10-CM

## 2024-09-12 PROCEDURE — 77067 SCR MAMMO BI INCL CAD: CPT

## 2024-09-12 PROCEDURE — 77067 SCR MAMMO BI INCL CAD: CPT | Performed by: RADIOLOGY

## 2024-09-12 PROCEDURE — 77063 BREAST TOMOSYNTHESIS BI: CPT | Performed by: RADIOLOGY

## 2024-10-01 ENCOUNTER — EVALUATION (OUTPATIENT)
Dept: SPEECH THERAPY | Facility: CLINIC | Age: 59
End: 2024-10-01
Payer: COMMERCIAL

## 2024-10-01 DIAGNOSIS — R41.841 COGNITIVE COMMUNICATION DEFICIT: Primary | ICD-10-CM

## 2024-10-01 DIAGNOSIS — R47.9 SPEECH DISTURBANCE, UNSPECIFIED TYPE: ICD-10-CM

## 2024-10-01 PROCEDURE — 96125 COGNITIVE TEST BY HC PRO: CPT | Mod: GN | Performed by: SPEECH-LANGUAGE PATHOLOGIST

## 2024-10-01 ASSESSMENT — PAIN SCALES - GENERAL: PAINLEVEL_OUTOF10: 0 - NO PAIN

## 2024-10-01 ASSESSMENT — PAIN - FUNCTIONAL ASSESSMENT: PAIN_FUNCTIONAL_ASSESSMENT: 0-10

## 2024-10-01 NOTE — PROGRESS NOTES
"Speech-Language Pathology  Adult Outpatient Speech-Language and Cognitive Assessment    Patient Name: Chanel Vizcarra  MRN: [id number]  : 1965  Today's Date: 10/01/24  Time Calculation  Start Time: 1045  Stop Time: 1145  Time Calculation (min): 60 min        Current Problem:   Cognitive communication deficit    SLP Assessment:    Chanel Vizcarra will benefit from skilled speech therapy at this time to address above deficits. Pt participated in the CLQT this date in which she was observed to have a mild cognitive deficits in memory and language. However, pt stated that she is not at her PLOF.  Pt stated she has seen a decrease in executive function and attention which has increased difficulty for ADLs.    Related Medical History:   Per doctor report, \" Past medical history hyperlipidemia hypertension concussion gerd   ER presentation May 13, 2024 chest pain EKG negative troponin negative  ER presentation 2024 foreign body ear     Patient states since her concussion over year agp feel somewhat foggy in the brain grade 3 out of 10 worse with the concussion little bit better over time  Memory not as good as it was  Abnormal itching sensation top of the right scalp where she had the trauma feels like it comes from the inside she states  Denies headaches focal weakness paresthesias vision changes.\"    SLP Plan:  Chanel Vizcarra is recommended to be seen for Skilled Speech Therapy 1 time per week for 16 weeks.  This was reviewed with family and they are in agreement of this.     Goals:  By discharge Chanel Vizcarra will:    LTGs:  Pt will increase cognitive linguistic skills to be able to fully participate in daily tasks without deficit at PLOF.    STGs:  Patient will complete divided attention tasks with 80% accuracy independently.  Patient will demonstrate an improvement in short term memory abilities by recalling 5-7 pieces of information each session.   Patient will recall 5-7 pieces of information " presented in verbal or written form, with/without a delay and with/without distraction with 90% accuracy when provided with moderate visual and verbal cueing.   Patient will be able to complete high level problem solving tasks (e.g. divergent/convergent tasks, sequencing, reasoning, etc.) at 80% with mod cues.   Patient will complete executive function tasks (e.g. path finding, prescription labels, etc) with 80% accuracy following min to mod verbal cues.  Patient will learn and use memory/cognitive compensatory strategies with return demo with 90% accuracy.  Pt will be use word finding strategies with 80% accuracy following min verbal cues.  Patient/Family education to be provided each session.        Consider referral to: Neuro Ophthalmologist  Prognosis: Good  Factors that may affect progress: None, Cognition, Communication, Cultural, Emotional, Family/Caregiver Support, Financial, Language, Motivation, Caodaism, Other.  Pt/family agrees to the reviewed goals and Plan for therapy.    Plan of care was developed with input and agreement by the pt/family.    Subjective:   Chanel MARTÍNEZ Harris was seen 1-on-1. Pt participated well throughout the session.           Date of Onset: 9/4/2023     Chief Complaint: Memory and word finding abilities  Prior Level of Function: WNL  Previous Therapies: None  Respiratory Status: Room air  Hearing: WNL  Vision: Glasses for reading  No overt symptoms/signs of abuse/neglect.   Precautions: None  Pt/family prefer to learn via demonstration, printed materials, performance, and explanation/discussion.    Pain:  Pain Assessment: 0-10   0-10 (Numeric) Pain Score: 0 - No pain    Insurance:  Reviewed: Yes  Number of Authorized Visits: Number of Authorized Treatments : Need auth after eval  Total Number of Visits:    Certification Period:   Ending:     Number of Authorized Treatments : Need auth after eval          Objective:  Portions of the Cognitive Linguistic Quick Test + (CLQT+) were  "administered this date.  The pt scored as follows:  Domains:  Attention: 192   Memory: 141   Executive Functions: 30  Language: 28  Visuospatial Skills: 95  Clock Drawin    This indicates a border-line \"mild\" cognitive deficit.  Pt demonstrated the most difficulty with memory and language tasks.  Although pt scored \"WNL\" for the other domains, pt stated she is not currently at her PLOF and is impacting her ability to complete ADLs.       Treatment Provided:   None provided    Outpatient Education:   Reviewed results of today's assessment.    Reviewed plan for Therapy and Chanel Vizcarra and family were in agreement of this.   Recommend further follow up with PT.          "

## 2024-10-08 ENCOUNTER — APPOINTMENT (OUTPATIENT)
Dept: SPEECH THERAPY | Facility: CLINIC | Age: 59
End: 2024-10-08
Payer: COMMERCIAL

## 2024-10-09 ENCOUNTER — APPOINTMENT (OUTPATIENT)
Dept: PRIMARY CARE | Facility: CLINIC | Age: 59
End: 2024-10-09
Payer: COMMERCIAL

## 2024-10-14 ENCOUNTER — APPOINTMENT (OUTPATIENT)
Dept: OBSTETRICS AND GYNECOLOGY | Facility: CLINIC | Age: 59
End: 2024-10-14
Payer: COMMERCIAL

## 2024-10-15 ENCOUNTER — TREATMENT (OUTPATIENT)
Dept: SPEECH THERAPY | Facility: CLINIC | Age: 59
End: 2024-10-15
Payer: COMMERCIAL

## 2024-10-15 DIAGNOSIS — R41.841 COGNITIVE COMMUNICATION DEFICIT: Primary | ICD-10-CM

## 2024-10-15 DIAGNOSIS — R47.9 UNSPECIFIED SPEECH DISTURBANCES: ICD-10-CM

## 2024-10-15 PROCEDURE — 97130 THER IVNTJ EA ADDL 15 MIN: CPT | Mod: GN | Performed by: SPEECH-LANGUAGE PATHOLOGIST

## 2024-10-15 PROCEDURE — 97129 THER IVNTJ 1ST 15 MIN: CPT | Mod: GN | Performed by: SPEECH-LANGUAGE PATHOLOGIST

## 2024-10-15 ASSESSMENT — PAIN SCALES - GENERAL: PAINLEVEL_OUTOF10: 0 - NO PAIN

## 2024-10-15 ASSESSMENT — PAIN - FUNCTIONAL ASSESSMENT: PAIN_FUNCTIONAL_ASSESSMENT: 0-10
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
## 2024-10-22 ENCOUNTER — APPOINTMENT (OUTPATIENT)
Dept: PSYCHOLOGY | Facility: HOSPITAL | Age: 59
End: 2024-10-22
Payer: COMMERCIAL

## 2024-10-22 ENCOUNTER — APPOINTMENT (OUTPATIENT)
Dept: SPEECH THERAPY | Facility: CLINIC | Age: 59
End: 2024-10-22
Payer: COMMERCIAL

## 2024-10-23 ENCOUNTER — DOCUMENTATION (OUTPATIENT)
Dept: SPEECH THERAPY | Facility: CLINIC | Age: 59
End: 2024-10-23
Payer: COMMERCIAL

## 2024-10-23 ENCOUNTER — APPOINTMENT (OUTPATIENT)
Dept: SPEECH THERAPY | Facility: CLINIC | Age: 59
End: 2024-10-23
Payer: COMMERCIAL

## 2024-10-23 DIAGNOSIS — R41.841 COGNITIVE COMMUNICATION DEFICIT: Primary | ICD-10-CM

## 2024-10-23 NOTE — PROGRESS NOTES
Speech-Language Pathology                 Therapy Communication Note    Patient Name: Chanel Vizcarra  MRN: [id number]  Department:   Room: Room/bed info not found  Today's Date: 10/23/2024     Discipline: Speech Language Pathology    Missed Time: Cancel    Comment:  Pt called and spoke with .  Called due to father having a heart attack.

## 2024-10-24 ENCOUNTER — OFFICE VISIT (OUTPATIENT)
Dept: PRIMARY CARE | Facility: CLINIC | Age: 59
End: 2024-10-24
Payer: COMMERCIAL

## 2024-10-24 VITALS — WEIGHT: 212 LBS | DIASTOLIC BLOOD PRESSURE: 86 MMHG | SYSTOLIC BLOOD PRESSURE: 134 MMHG | BODY MASS INDEX: 41.4 KG/M2

## 2024-10-24 DIAGNOSIS — Z12.11 COLON CANCER SCREENING: Primary | ICD-10-CM

## 2024-10-24 DIAGNOSIS — M20.001 ACQUIRED DEFORMITY OF JOINT OF FINGER OF RIGHT HAND: ICD-10-CM

## 2024-10-24 PROCEDURE — 99215 OFFICE O/P EST HI 40 MIN: CPT | Performed by: INTERNAL MEDICINE

## 2024-10-24 PROCEDURE — 1036F TOBACCO NON-USER: CPT | Performed by: INTERNAL MEDICINE

## 2024-10-24 ASSESSMENT — PATIENT HEALTH QUESTIONNAIRE - PHQ9
1. LITTLE INTEREST OR PLEASURE IN DOING THINGS: NOT AT ALL
SUM OF ALL RESPONSES TO PHQ9 QUESTIONS 1 AND 2: 0
2. FEELING DOWN, DEPRESSED OR HOPELESS: NOT AT ALL
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
## 2024-10-28 ENCOUNTER — APPOINTMENT (OUTPATIENT)
Dept: PRIMARY CARE | Facility: CLINIC | Age: 59
End: 2024-10-28
Payer: COMMERCIAL

## 2024-10-29 ENCOUNTER — APPOINTMENT (OUTPATIENT)
Dept: OBSTETRICS AND GYNECOLOGY | Facility: CLINIC | Age: 59
End: 2024-10-29
Payer: COMMERCIAL

## 2024-10-29 ENCOUNTER — TREATMENT (OUTPATIENT)
Dept: SPEECH THERAPY | Facility: CLINIC | Age: 59
End: 2024-10-29
Payer: COMMERCIAL

## 2024-10-29 DIAGNOSIS — R41.841 COGNITIVE COMMUNICATION DEFICIT: Primary | ICD-10-CM

## 2024-10-29 DIAGNOSIS — R47.9 UNSPECIFIED SPEECH DISTURBANCES: ICD-10-CM

## 2024-10-29 PROCEDURE — 97129 THER IVNTJ 1ST 15 MIN: CPT | Mod: GN | Performed by: SPEECH-LANGUAGE PATHOLOGIST

## 2024-10-29 PROCEDURE — 97130 THER IVNTJ EA ADDL 15 MIN: CPT | Mod: GN | Performed by: SPEECH-LANGUAGE PATHOLOGIST

## 2024-10-29 ASSESSMENT — PAIN - FUNCTIONAL ASSESSMENT: PAIN_FUNCTIONAL_ASSESSMENT: 0-10

## 2024-10-29 ASSESSMENT — PAIN SCALES - GENERAL: PAINLEVEL_OUTOF10: 0 - NO PAIN

## 2024-11-05 ENCOUNTER — TREATMENT (OUTPATIENT)
Dept: SPEECH THERAPY | Facility: CLINIC | Age: 59
End: 2024-11-05
Payer: COMMERCIAL

## 2024-11-05 DIAGNOSIS — R41.841 COGNITIVE COMMUNICATION DEFICIT: Primary | ICD-10-CM

## 2024-11-05 DIAGNOSIS — R47.9 UNSPECIFIED SPEECH DISTURBANCES: ICD-10-CM

## 2024-11-05 PROCEDURE — 97130 THER IVNTJ EA ADDL 15 MIN: CPT | Mod: GN | Performed by: SPEECH-LANGUAGE PATHOLOGIST

## 2024-11-05 PROCEDURE — 97129 THER IVNTJ 1ST 15 MIN: CPT | Mod: GN | Performed by: SPEECH-LANGUAGE PATHOLOGIST

## 2024-11-05 ASSESSMENT — PAIN SCALES - GENERAL: PAINLEVEL_OUTOF10: 0 - NO PAIN

## 2024-11-05 ASSESSMENT — PAIN - FUNCTIONAL ASSESSMENT: PAIN_FUNCTIONAL_ASSESSMENT: 0-10
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
## 2024-11-11 ENCOUNTER — APPOINTMENT (OUTPATIENT)
Dept: OBSTETRICS AND GYNECOLOGY | Facility: CLINIC | Age: 59
End: 2024-11-11
Payer: COMMERCIAL

## 2024-11-12 ENCOUNTER — TREATMENT (OUTPATIENT)
Dept: SPEECH THERAPY | Facility: CLINIC | Age: 59
End: 2024-11-12
Payer: COMMERCIAL

## 2024-11-12 DIAGNOSIS — R41.841 COGNITIVE COMMUNICATION DEFICIT: Primary | ICD-10-CM

## 2024-11-12 DIAGNOSIS — R47.9 UNSPECIFIED SPEECH DISTURBANCES: ICD-10-CM

## 2024-11-12 PROCEDURE — 97129 THER IVNTJ 1ST 15 MIN: CPT | Mod: GN | Performed by: SPEECH-LANGUAGE PATHOLOGIST

## 2024-11-12 PROCEDURE — 97130 THER IVNTJ EA ADDL 15 MIN: CPT | Mod: GN | Performed by: SPEECH-LANGUAGE PATHOLOGIST

## 2024-11-12 ASSESSMENT — PAIN - FUNCTIONAL ASSESSMENT: PAIN_FUNCTIONAL_ASSESSMENT: 0-10

## 2024-11-12 ASSESSMENT — PAIN SCALES - GENERAL: PAINLEVEL_OUTOF10: 0 - NO PAIN

## 2024-11-12 NOTE — PROGRESS NOTES
"Speech-Language Pathology    Outpatient Speech-Language Pathology Treatment    Patient Name: Chanel Vizcarra  MRN: [id number]  : 1965  Today's Date: 24  Time Calculation  Start Time: 1115  Stop Time: 1200  Time Calculation (min): 45 min        Current Problem:  Cognitive communication deficit    SLP Assessment  Pt arrived on time and participated well.  SLP and pt continued discussion from last session regarding organizations skills.  Pt showed SLP her new notebook that contains just her passwords.  She also stated that she was able to organize other important information and place them in folders.  Pt stated she \"feels better about it\" and would be able to locate materials quickly.  Pt also stated she contacted [facility] regarding therapy. However, she is unable to pay out of pocket. Pt stated she was able to find another place that would be able to accept her insurance and is looking into her options.  Pt participated well throughout the session and remained motivated for all tasks.  Pt focused on executive functioning, attention, and problem solving this date.      Plan  SLP Tx Plan:  Continue with POC.   SLP Plan: Skilled SLP  SLP Frequency: 1x/week  Duration: 12 weeks    Subjective  Chanel Vizcarra was pleasant and participated well throughout the session.     General Visit Info  Number of Authorized Treatments : 20 visits 10/1-2024-2024   Total Number of Visits : 4     Insurance Reviewed this date: yes    Pain  Pain Assessment: 0-10   0-10 (Numeric) Pain Score: 0 - No pain    Objective    LTGs:  Pt will increase cognitive linguistic skills to be able to fully participate in daily tasks without deficit at PLOF.Goal established 10/01/2024     STGs    1. Patient will complete divided attention tasks with 80% accuracy independently.Goal established 10/01/2024--Goal progressing 2024.  Pt completed divided attention task with 98% accuracy independently.  One more session " "before goal is met.     2.  Patient will demonstrate an improvement in short term memory abilities by recalling 5-7 pieces of information from a paragraph each session. Goal established 10/01/2024--Goal not targeted  11/12/2024-    3.  Patient will recall 5-7 pieces of information presented in verbal or written form, with/without a delay and with/without distraction with 90% accuracy when provided with moderate visual and verbal cueing. Goal established 10/01/2024--Goal not targeted 11/12/2024-    4.  Patient will be able to complete high level problem solving tasks (e.g. divergent/convergent tasks, sequencing, reasoning, word deduction, etc.) at 80% with mod cues. Goal established 10/01/2024--Goal progressing 11/12/2024.--SLP discussed Spoon Theory with pt and asked her to complete \"Drain activity\" for HEP.  Pt verbally agreed.      5.  Patient will complete executive function tasks (e.g. path finding, prescription labels, etc) with 80% accuracy following min to mod verbal cues.Goal established 10/01/2024--Goal progressing 11/12/2024.  -Completed logic puzzle with 75% accuracy following min verbal cues.  Increased to 100% accuracy following mod to max verbal cues.    6.  Patient will learn and use memory/cognitive compensatory strategies with return demo with 90% accuracy.Goal established 10/01/2024--Goal progressing 11/12/2024.  SLP and pt discussed organization which she completed for HEP.  Pt stated she is able to locate correct material quicker and is more organized.      7.  Pt will be use word finding strategies with 80% accuracy following min verbal cues.Goal established 10/01/2024--Goal not targeted 11/12/2024.--    8.  Patient/Family education to be provided each session.  Goal established 10/01/2024--Goal progressing 11/12/2024.    Education  Education was provided to pt/family and reviewed how to carryover strategies learned in session to home.           "

## 2024-11-19 ENCOUNTER — TREATMENT (OUTPATIENT)
Dept: SPEECH THERAPY | Facility: CLINIC | Age: 59
End: 2024-11-19
Payer: COMMERCIAL

## 2024-11-19 DIAGNOSIS — R41.841 COGNITIVE COMMUNICATION DEFICIT: Primary | ICD-10-CM

## 2024-11-19 DIAGNOSIS — R47.9 UNSPECIFIED SPEECH DISTURBANCES: ICD-10-CM

## 2024-11-19 PROCEDURE — 97130 THER IVNTJ EA ADDL 15 MIN: CPT | Mod: GN | Performed by: SPEECH-LANGUAGE PATHOLOGIST

## 2024-11-19 PROCEDURE — 97129 THER IVNTJ 1ST 15 MIN: CPT | Mod: GN | Performed by: SPEECH-LANGUAGE PATHOLOGIST

## 2024-11-19 ASSESSMENT — PAIN - FUNCTIONAL ASSESSMENT: PAIN_FUNCTIONAL_ASSESSMENT: 0-10

## 2024-11-19 ASSESSMENT — PAIN SCALES - GENERAL: PAINLEVEL_OUTOF10: 0 - NO PAIN

## 2024-11-19 NOTE — PROGRESS NOTES
"Speech-Language Pathology    Outpatient Speech-Language Pathology Treatment    Patient Name: Chanel Vizcarra  MRN: [id number]  : 1965  Today's Date: 24  Time Calculation  Start Time: 1115  Stop Time: 1200  Time Calculation (min): 45 min        Current Problem:  Cognitive communication deficit    SLP Assessment  Pt arrived on time and participated well throughout the session.  SLP and pt continue discussion regarding organizational skills.  Pt stated she was required to change all passwords due to certain circumstances.  However, she was able to remain organized and was able to transfer all important information over to another notebook.  Pt expressed frustration several times during the session and stated \"its just overwhelming.\"  Pt stated she attempted her \"Spoon Theory\" HEP but became overwhelmed and had to stop.  SLP encouraged her to try task for HEP again this week before attempting different strategy.  Pt verbally agreed. Pt also stated she had a vision appointment in December in which the doctor will refer to a neuro optometrist if needed.  SLP verbally agreed.  Pt participated well throughout the session and remained motivated for all tasks.  Pt focused on divided attention, recall of items, sequencing, executive function tasks, and divergent naming.      Plan  SLP Tx Plan:  Continue with POC.   SLP Plan: Skilled SLP  SLP Frequency: 1x/week  Duration: 12 weeks    Subjective  Chanel Vizcarra was pleasant and participated well throughout the session.     General Visit Info  Number of Authorized Treatments : 20 visits 10/1-2024-2024   Total Number of Visits : 5     Insurance Reviewed this date: yes    Pain  Pain Assessment: 0-10   0-10 (Numeric) Pain Score: 0 - No pain    Objective    LTGs:  Pt will increase cognitive linguistic skills to be able to fully participate in daily tasks without deficit at PLOF.Goal established 10/01/2024     STGs    1. Patient will complete divided attention tasks " with 80% accuracy independently.Goal established 10/01/2024--Goal progressing/met 2024.  Pt completed divided attention task with 90% accuracy independently.  This is the third consecutive session in which she has met all requirement for this goal.  This goal is now met 2024.     2.  Patient will demonstrate an improvement in short term memory abilities by recalling 5-7 pieces of information from a paragraph each session. Goal established 10/01/2024--Goal not targeted 2024.    3.  Patient will recall 5-7 pieces of information presented in verbal or written form, with/without a delay and with/without distraction with 90% accuracy when provided with moderate visual and verbal cueing. Goal established 10/01/2024--Goal progressing 2024-After reviewed strategies of repetition and association, pt was able to recall 5 items using the strategies of association in 1/1 trials.  Good working.    4.  Patient will be able to complete high level problem solving tasks (e.g. divergent/convergent tasks, sequencing, reasoning, word deduction, etc.) at 80% with mod cues. Goal established 10/01/2024--Goal progressing 2024.--  Divergent namin% accuracy independently and increased to 100% accuracy following min vebral cues.  Two more session for this part of the goal.    SLP discussed Spoon Theory with pt and asked her attempt to complete for HEP.  Pt reports being overwhelmed with this last week.  Pt willing to try again.       5.  Patient will complete executive function tasks (e.g. path finding, prescription labels, etc) with 80% accuracy following min to mod verbal cues.Goal established 10/01/2024--Goal progressing 2024.  -Completed deduction calendar puzzle with 75% accuracy following min to mod verbal cues.  Required moderate verbal cues to increase to 100% accuracy.    6.  Patient will learn and use memory/cognitive compensatory strategies with return demo with 90% accuracy.Goal established  10/01/2024--Goal not targeted 11/19/2024.      7.  Pt will be use word finding strategies with 80% accuracy following min verbal cues.Goal established 10/01/2024--Goal not targeted 11/129/2024.--    8.  Patient/Family education to be provided each session.  Goal established 10/01/2024--Goal progressing 11/19/2024.    Education  Education was provided to pt/family and reviewed how to carryover strategies learned in session to home.

## 2024-11-26 ENCOUNTER — DOCUMENTATION (OUTPATIENT)
Dept: SPEECH THERAPY | Facility: CLINIC | Age: 59
End: 2024-11-26
Payer: COMMERCIAL

## 2024-11-26 ENCOUNTER — APPOINTMENT (OUTPATIENT)
Dept: SPEECH THERAPY | Facility: CLINIC | Age: 59
End: 2024-11-26
Payer: COMMERCIAL

## 2024-11-26 DIAGNOSIS — R41.841 COGNITIVE COMMUNICATION DEFICIT: Primary | ICD-10-CM

## 2024-11-26 NOTE — PROGRESS NOTES
Speech-Language Pathology                 Therapy Communication Note    Patient Name: Chanel Vizcarra  MRN: [id number]  Department:   Room: Room/bed info not found  Today's Date: 11/26/2024     Discipline: Speech Language Pathology    Missed Visit Reason:  Cx    Missed Time: Cancel    Comment: Pt called to cancel appointment.  Pt has a conflicting appointment.

## 2024-12-03 ENCOUNTER — TREATMENT (OUTPATIENT)
Dept: SPEECH THERAPY | Facility: CLINIC | Age: 59
End: 2024-12-03
Payer: COMMERCIAL

## 2024-12-03 DIAGNOSIS — R47.9 UNSPECIFIED SPEECH DISTURBANCES: ICD-10-CM

## 2024-12-03 DIAGNOSIS — R41.841 COGNITIVE COMMUNICATION DEFICIT: Primary | ICD-10-CM

## 2024-12-03 PROCEDURE — 97129 THER IVNTJ 1ST 15 MIN: CPT | Mod: GN | Performed by: SPEECH-LANGUAGE PATHOLOGIST

## 2024-12-03 PROCEDURE — 97130 THER IVNTJ EA ADDL 15 MIN: CPT | Mod: GN | Performed by: SPEECH-LANGUAGE PATHOLOGIST

## 2024-12-03 ASSESSMENT — PAIN - FUNCTIONAL ASSESSMENT: PAIN_FUNCTIONAL_ASSESSMENT: 0-10

## 2024-12-03 ASSESSMENT — PAIN SCALES - GENERAL: PAINLEVEL_OUTOF10: 0 - NO PAIN

## 2024-12-03 NOTE — PROGRESS NOTES
"Speech-Language Pathology    Outpatient Speech-Language Pathology Treatment    Patient Name: Chanel Vizcarra  MRN: [id number]  : 1965  Today's Date: 24           Current Problem:  Cognitive communication deficit    SLP Assessment  Pt arrived on time and participated well. Pt expressed frustration during the session stating, \"it's just too much\" or \"it can be so overwhelming at times.\"  Pt explained that she still demonstrates difficulty with word finding during conversations with family member.  SLP explained rationale for each therapy tasks and how it correlates to real life.  Pt verbally agreed.  Pt also stated that she feels calm and relaxed in therapy which allows her to focus more and demonstrate more success.  SLP added background noise at the end of today's session for distractions.  Pt continues to work on her cognition goals including word finding strategies, divergent naming, memory strategies, and auditory comprehension.        Plan  SLP Tx Plan:  Continue with POC.   SLP Plan: Skilled SLP  SLP Frequency: 1x/week  Duration: 12 weeks    Subjective  Chanel Vizcarra was pleasant and participated well throughout the session.     General Visit Info  Number of Authorized Treatments : 20 visits 10/1-2024-2024   Total Number of Visits : 6     Insurance Reviewed this date: yes    Pain  Pain Assessment: 0-10   0-10 (Numeric) Pain Score: 0 - No pain    Objective    LTGs:  Pt will increase cognitive linguistic skills to be able to fully participate in daily tasks without deficit at PLOF.Goal established 10/01/2024     STGs    1. Patient will complete divided attention tasks with 80% accuracy independently.Goal established 10/01/2024--Goal progressing/met 2024.  This goal is now met 2024.     2.  Patient will demonstrate an improvement in short term memory abilities by recalling 5-7 pieces of information from a paragraph each session. Goal established 10/01/2024--Goal progressing " 12/3/2024--82% accuracy independently or following min verbal cues.  Two more sessions before goal is met.    3.  Patient will recall 5-7 pieces of information presented in verbal or written form, with/without a delay and with/without distraction with 90% accuracy when provided with moderate visual and verbal cueing. Goal established 10/01/2024--Goal progressing 12/3/2024--Pt used memory strategy of repetition in order to recall 5 objects.  Pt demonstrated success in 1/2 trials.  Required moderate verbal cues in order to increase to 2/2 trials.    4.  Patient will be able to complete high level problem solving tasks (e.g. divergent/convergent tasks, sequencing, reasoning, word deduction, etc.) at 80% with mod cues. Goal established 10/01/2024--Goal progressing 12/3/2024.--  Divergent namin% accuracy independently or following min verbal cues.  One more session before goal is met.    5.  Patient will complete executive function tasks (e.g. path finding, prescription labels, etc) with 80% accuracy following min to mod verbal cues.Goal established 10/01/2024--Goal not targeted 2024.      6.  Patient will learn and use memory/cognitive compensatory strategies with return demo with 90% accuracy.Goal established 10/01/2024--Goal progressing 12/3/2024.  Discussed strategies of repetition and association.  Pt gave several examples back to SLP with 100% accuracy.  Two more sessions before goal is met.    7.  Pt will be use word finding strategies with 80% accuracy following min verbal cues.Goal established 10/01/2024--Goal progressing 12/3/2024.-- Educated pt on the strategies of circumlocution.  Pt was able to successfully use strategy with 100% accuracy independently or following min verbal cues.  Two more sessions before goal is met.    8.  Patient/Family education to be provided each session.  Goal established 10/01/2024--Goal progressing 12/3/2024.    Education  Education was provided to pt/family and  reviewed how to carryover strategies learned in session to home.

## 2024-12-10 ENCOUNTER — TREATMENT (OUTPATIENT)
Dept: SPEECH THERAPY | Facility: CLINIC | Age: 59
End: 2024-12-10
Payer: COMMERCIAL

## 2024-12-10 DIAGNOSIS — R47.9 UNSPECIFIED SPEECH DISTURBANCES: ICD-10-CM

## 2024-12-10 DIAGNOSIS — R41.841 COGNITIVE COMMUNICATION DEFICIT: Primary | ICD-10-CM

## 2024-12-10 PROCEDURE — 97129 THER IVNTJ 1ST 15 MIN: CPT | Mod: GN | Performed by: SPEECH-LANGUAGE PATHOLOGIST

## 2024-12-10 PROCEDURE — 97130 THER IVNTJ EA ADDL 15 MIN: CPT | Mod: GN | Performed by: SPEECH-LANGUAGE PATHOLOGIST

## 2024-12-10 ASSESSMENT — PAIN - FUNCTIONAL ASSESSMENT: PAIN_FUNCTIONAL_ASSESSMENT: 0-10

## 2024-12-10 ASSESSMENT — PAIN SCALES - GENERAL: PAINLEVEL_OUTOF10: 0 - NO PAIN

## 2024-12-10 NOTE — PROGRESS NOTES
Speech-Language Pathology    Outpatient Speech-Language Pathology Treatment    Patient Name: Chanel Vizcarra  MRN: [id number]  : 1965  Today's Date: 12/10/24  Time Calculation  Start Time: 1115  Stop Time: 1200  Time Calculation (min): 45 min        Current Problem:  Cognitive communication deficit    SLP Assessment  Pt arrived on time and reported no pain or medical changes since last session.  Pt reports she visited the doctor on December 3, 2024 as she was experiencing pressure at the concussion site.  Per pt report, the doctor is no concerned at this time.  He wants her to continue to monitor symptoms.  Pt participated well and remained engaged in all therapy tasks.  Pt continues to work on memory strategies, executive functioning tasks,  and auditory comprehension.      Plan  SLP Tx Plan:  Continue with POC.   SLP Plan: Skilled SLP  SLP Frequency: 1x/week  Duration: 12 weeks    Subjective  Chanel Vizcarra was pleasant and participated well throughout the session. Seen 1-on-1 with SLP.    General Visit Info  Number of Authorized Treatments : 20 visits 10/1-2024-2024   Total Number of Visits : 7     Insurance Reviewed this date: yes    Pain  Pain Assessment: 0-10   0-10 (Numeric) Pain Score: 0 - No pain    Objective    LTGs:  Pt will increase cognitive linguistic skills to be able to fully participate in daily tasks without deficit at PLOF.Goal established 10/01/2024     STGs    1. Patient will complete divided attention tasks with 80% accuracy independently.Goal established 10/01/2024--Goal progressing/met 2024.  This goal is now met 2024.     2.  Patient will demonstrate an improvement in short term memory abilities by recalling 5-7 pieces of information from a paragraph each session. Goal established 10/01/2024--Goal progressing 12/10/2024--88% accuracy independently or following min verbal cues.  Great working.  One more session before goal is met.    3.  Patient will recall 5-7 pieces  of information presented in verbal or written form, with/without a delay and with/without distraction with 90% accuracy when provided with moderate visual and verbal cueing. Goal established 10/01/2024--Goal progressing 12/10/2024--Pt used memory strategy of association in order to recall 5 objects.  Pt demonstrated success in 2/2 trials independently.  Wonderful working.  Two more sessions before goal is met.    4.  Patient will be able to complete high level problem solving tasks (e.g. divergent/convergent tasks, sequencing, reasoning, word deduction, etc.) at 80% with mod cues. Goal established 10/01/2024--Goal not targeted 12/10/2024.--    5.  Patient will complete executive function tasks (e.g. path finding, prescription labels, etc) with 80% accuracy following min to mod verbal cues.Goal established 10/01/2024--Goal progressing 12/10/2024.  --Completed pill sorting activity with 85% accuracy independently or following min verbal cues.  Two more sessions before goal is met.  Please note, pt was able to complete task in 10 min.  Pt stated it takes her an hour to complete the task for her father because she is constantly rechecking.    6.  Patient will learn and use memory/cognitive compensatory strategies with return demo with 90% accuracy.Goal established 10/01/2024--Goal progressing 12/3/2024.  Pt worked on her strategy of association this date.  This appeared to work better than repetition.  Pt also stated that she is continuing write information down on paper to help with organization at home.  One more session before goal is met.    7.  Pt will be use word finding strategies with 80% accuracy following min verbal cues.Goal established 10/01/2024--Goal not targeted 12/10/2024.-- Two more sessions before goal is met.    8.  Patient/Family education to be provided each session.  Goal established 10/01/2024--Goal progressing 12/10/2024.--Continue to educate pt on strategies.  Pt verbally  agreed.    Education  Education was provided to pt/family and reviewed how to carryover strategies learned in session to home.

## 2024-12-17 ENCOUNTER — TREATMENT (OUTPATIENT)
Dept: SPEECH THERAPY | Facility: CLINIC | Age: 59
End: 2024-12-17
Payer: COMMERCIAL

## 2024-12-17 DIAGNOSIS — R41.841 COGNITIVE COMMUNICATION DEFICIT: Primary | ICD-10-CM

## 2024-12-17 DIAGNOSIS — R47.9 UNSPECIFIED SPEECH DISTURBANCES: ICD-10-CM

## 2024-12-17 PROCEDURE — 97130 THER IVNTJ EA ADDL 15 MIN: CPT | Mod: GN | Performed by: SPEECH-LANGUAGE PATHOLOGIST

## 2024-12-17 PROCEDURE — 97129 THER IVNTJ 1ST 15 MIN: CPT | Mod: GN | Performed by: SPEECH-LANGUAGE PATHOLOGIST

## 2024-12-17 NOTE — PROGRESS NOTES
Speech-Language Pathology    Outpatient Speech-Language Pathology Treatment    Patient Name: Chanel Vizcarra  MRN: [id number]  : 1965  Today's Date: 24  Time Calculation  Start Time: 1115  Stop Time: 1200  Time Calculation (min): 45 min        Current Problem:  Cognitive communication deficit    SLP Assessment  Pt arrived on time and reported no pain or medical changes since last session.  Pt appeared slightly agitated at the start of the session due to outside circumstances.  Pt was able to be redirected and focused for the remainder of the session.  SLP suggested seeking additional counseling in order to help pt with her overwhelming feelings.  Pt verbally agreed and stated she had an initial consultation with someone a while back but decided not to pursue it.  SLP suggested it might be good to look into again.  Pt verbally agreed. Pt continues to work on her executive functioning tasks, memory, and high level problems solving skills.      Plan  SLP Tx Plan:  Continue with POC.   SLP Plan: Skilled SLP  SLP Frequency: 1x/week  Duration: 12 weeks    Subjective  Chanel Vizcarra was pleasant and participated well throughout the session. Seen 1-on-1 with SLP.    General Visit Info  Number of Authorized Treatments : 20 visits 10/1-2024-2024   Total Number of Visits : 8     Insurance Reviewed this date: yes    Pain           Objective    LTGs:  Pt will increase cognitive linguistic skills to be able to fully participate in daily tasks without deficit at PLOF.Goal established 10/01/2024     STGs    1. Patient will complete divided attention tasks with 80% accuracy independently.Goal established 10/01/2024--Goal progressing/met 2024.  This goal is now met 2024.     2.  Patient will demonstrate an improvement in short term memory abilities by recalling 5-7 pieces of information from a paragraph each session. Goal established 10/01/2024--Goal not targeted 2024--    3.  Patient will recall  5-7 pieces of information presented in verbal or written form, with/without a delay and with/without distraction with 90% accuracy when provided with moderate visual and verbal cueing. Goal established 10/01/2024--Goal progressing 12/17/2024--Pt used memory strategy of association in order to recall 5 objects in 2/3 trials.      4.  Patient will be able to complete high level problem solving tasks (e.g. divergent/convergent tasks, sequencing, reasoning, word deduction, etc.) at 80% with mod cues. Goal established 10/01/2024--Goal progressing 12/1072024.--  Convergent: 90% accuracy following min verbal cues.  Word deduction: 90% accuracy following min verbal cues.    Two more sessions before goal is met.    5.  Patient will complete executive function tasks (e.g. path finding, prescription labels, etc) with 80% accuracy following min to mod verbal cues.Goal established 10/01/2024--Goal progressing 12/17/2024.  --Completed logic puzzle with 70% accuracy independently and increased to 100% accuracy following moderate verbal cues.  Two more sessions before goal is met.    6.  Patient will learn and use memory/cognitive compensatory strategies with return demo with 90% accuracy.Goal established 10/01/2024--Goal progressing/met  12/17/2024.  Continued with memory strategy of association. Pt was able to define strategy and use it appropriately with 9% accuracy independently.  This is the third consecutive session in which she has met all requirements for this goal.  This goal is now met 12/17/2024    7.  Pt will be use word finding strategies with 80% accuracy following min verbal cues.Goal established 10/01/2024--Goal not targeted 12/10/2024.-- Two more sessions before goal is met.    8.  Patient/Family education to be provided each session.  Goal established 10/01/2024--Goal progressing 12/17/2024.--Continue to educate pt on strategies.  Pt verbally agreed.    Education  Education was provided to pt/family and reviewed  how to carryover strategies learned in session to home.

## 2024-12-24 ENCOUNTER — APPOINTMENT (OUTPATIENT)
Dept: SPEECH THERAPY | Facility: CLINIC | Age: 59
End: 2024-12-24
Payer: COMMERCIAL

## 2024-12-31 ENCOUNTER — APPOINTMENT (OUTPATIENT)
Dept: SPEECH THERAPY | Facility: CLINIC | Age: 59
End: 2024-12-31
Payer: COMMERCIAL

## 2025-01-07 ENCOUNTER — APPOINTMENT (OUTPATIENT)
Dept: SPEECH THERAPY | Facility: CLINIC | Age: 60
End: 2025-01-07
Payer: COMMERCIAL

## 2025-01-07 ENCOUNTER — DOCUMENTATION (OUTPATIENT)
Dept: SPEECH THERAPY | Facility: CLINIC | Age: 60
End: 2025-01-07
Payer: COMMERCIAL

## 2025-01-07 DIAGNOSIS — R41.841 COGNITIVE COMMUNICATION DEFICIT: Primary | ICD-10-CM

## 2025-01-07 NOTE — PROGRESS NOTES
Speech-Language Pathology                 Therapy Communication Note    Patient Name: Chanel Vizcarra  MRN: [id number]  Department:   [facility] speech therapy  Today's Date: 1/7/2025     Discipline: Speech Language Pathology    Missed Time: Cancel    Comment: Pt is sick and has no voice.  Session in cx for today.

## 2025-01-14 ENCOUNTER — TREATMENT (OUTPATIENT)
Dept: SPEECH THERAPY | Facility: CLINIC | Age: 60
End: 2025-01-14
Payer: COMMERCIAL

## 2025-01-14 DIAGNOSIS — R41.841 COGNITIVE COMMUNICATION DEFICIT: Primary | ICD-10-CM

## 2025-01-14 PROCEDURE — 97130 THER IVNTJ EA ADDL 15 MIN: CPT | Mod: GN | Performed by: SPEECH-LANGUAGE PATHOLOGIST

## 2025-01-14 PROCEDURE — 97129 THER IVNTJ 1ST 15 MIN: CPT | Mod: GN | Performed by: SPEECH-LANGUAGE PATHOLOGIST

## 2025-01-14 ASSESSMENT — PAIN - FUNCTIONAL ASSESSMENT: PAIN_FUNCTIONAL_ASSESSMENT: 0-10

## 2025-01-14 ASSESSMENT — PAIN SCALES - GENERAL: PAINLEVEL_OUTOF10: 0 - NO PAIN

## 2025-01-14 NOTE — PROGRESS NOTES
Speech-Language Pathology    Outpatient Speech-Language Pathology Treatment    Patient Name: Chanel Vizcarra  MRN: [id number]  : 1965  Today's Date: 25  Time Calculation  Start Time: 1115  Stop Time: 1200  Time Calculation (min): 45 min        Current Problem:  Cognitive communication deficit    SLP Assessment  Pt arrived on time.  Pt reports she has been feeling overwhelmed lately.  She stated she is starting to decrease conversations with her  because word finding is difficult.  SLP and pt discussed communication strategies used in the therapy session and how that would translate to her home.  Pt verbally agreed. Pt also stated that she feels uncoordinated when picking up objects.  SLP recommended to see PT or OT.  Pt stated she has referrals for both. Pt called  during the session in order to have him schedule appointments.  Pt participated well during the session.      Plan  SLP Tx Plan:  Continue with POC.   SLP Plan: Skilled SLP  SLP Frequency: 1x/week  Duration: 12 weeks    Subjective  Chanel Vizcarra was pleasant and participated well throughout the session. Seen 1-on-1 with SLP.    General Visit Info  Number of Authorized Treatments : BMN    Total Number of Visits : 1     Insurance Reviewed this date: yes    Pain  Pain Assessment: 0-10   0-10 (Numeric) Pain Score: 0 - No pain    Objective    LTGs:  Pt will increase cognitive linguistic skills to be able to fully participate in daily tasks without deficit at PLOF.Goal established 10/01/2024     STGs    1. Patient will complete divided attention tasks with 80% accuracy independently.Goal established 10/01/2024--Goal progressing/met 2024.  This goal is now met 2024.     2.  Patient will demonstrate an improvement in short term memory abilities by recalling 5-7 pieces of information from a paragraph each session. Goal established 10/01/2024--Goal not targeted 2025--    3.  Patient will recall 5-7 pieces of  information presented in verbal or written form, with/without a delay and with/without distraction with 90% accuracy when provided with moderate visual and verbal cueing. Goal established 10/01/2024--Goal not targeted 1/14/2025-    4.  Patient will be able to complete high level problem solving tasks (e.g. divergent/convergent tasks, sequencing, reasoning, word deduction, etc.) at 80% with mod cues. Goal established 10/01/2024--Goal progressing  1/14/2025--  Two more sessions before goal is met.    5.  Patient will complete executive function tasks (e.g. path finding, prescription labels, etc) with 80% accuracy following min to mod verbal cues.Goal established 10/01/2024--Goal not targeted 1/14/2025  --Two more sessions before goal is met.    6.  Patient will learn and use memory/cognitive compensatory strategies with return demo with 90% accuracy.Goal established 10/01/2024--Goal progressing/met   1/14/2025.  This goal is now met 12/17/2024    7.  Pt will be use word finding strategies with 80% accuracy following min verbal cues.Goal established 10/01/2024--Goal progressing 1/14/2025.--Pt and SLP reviewed the communication strategy of circumlocution.  Pt was able to use strategy during structured speech task with 95% accuracy following min verbal cues.  Great working. One more session before goal is met.    8.  Patient/Family education to be provided each session.  Goal established 10/01/2024--Goal progressing  1/14/2025.--Pt appeared visibly upset during the session. She stated that she has been overwhelmed with word finding and memory tasks.  Pt is aware of her compensatory strategies and SLP provided education and counseling as appropriate.      Education  Education was provided to pt/family and reviewed how to carryover strategies learned in session to home.

## 2025-01-21 ENCOUNTER — APPOINTMENT (OUTPATIENT)
Dept: SPEECH THERAPY | Facility: CLINIC | Age: 60
End: 2025-01-21
Payer: COMMERCIAL

## 2025-01-21 ENCOUNTER — DOCUMENTATION (OUTPATIENT)
Dept: SPEECH THERAPY | Facility: CLINIC | Age: 60
End: 2025-01-21
Payer: COMMERCIAL

## 2025-01-21 DIAGNOSIS — R41.841 COGNITIVE COMMUNICATION DEFICIT: Primary | ICD-10-CM

## 2025-01-21 NOTE — PROGRESS NOTES
Speech-Language Pathology                 Therapy Communication Note    Patient Name: Chanel Vizcarra  MRN: [id number]  Department:   [facility] speech therapy  Today's Date: 1/21/2025     Discipline: Speech Language Pathology      Missed Time: Cancel    Comment: Pt called to cx appointment.  Pt stated she is sick and the weather is too cold.

## 2025-01-23 ENCOUNTER — LAB (OUTPATIENT)
Dept: LAB | Facility: LAB | Age: 60
End: 2025-01-23
Payer: COMMERCIAL

## 2025-01-23 ENCOUNTER — APPOINTMENT (OUTPATIENT)
Dept: PRIMARY CARE | Facility: CLINIC | Age: 60
End: 2025-01-23
Payer: COMMERCIAL

## 2025-01-23 VITALS — WEIGHT: 211 LBS | SYSTOLIC BLOOD PRESSURE: 134 MMHG | BODY MASS INDEX: 41.21 KG/M2 | DIASTOLIC BLOOD PRESSURE: 84 MMHG

## 2025-01-23 DIAGNOSIS — K21.9 GASTROESOPHAGEAL REFLUX DISEASE, UNSPECIFIED WHETHER ESOPHAGITIS PRESENT: ICD-10-CM

## 2025-01-23 DIAGNOSIS — Z12.31 VISIT FOR SCREENING MAMMOGRAM: ICD-10-CM

## 2025-01-23 DIAGNOSIS — R42 VERTIGO: ICD-10-CM

## 2025-01-23 DIAGNOSIS — E78.5 HYPERLIPIDEMIA, UNSPECIFIED HYPERLIPIDEMIA TYPE: Primary | ICD-10-CM

## 2025-01-23 DIAGNOSIS — I10 PRIMARY HYPERTENSION: ICD-10-CM

## 2025-01-23 DIAGNOSIS — Z00.00 HEALTHCARE MAINTENANCE: ICD-10-CM

## 2025-01-23 DIAGNOSIS — F43.9 STRESS: ICD-10-CM

## 2025-01-23 DIAGNOSIS — F07.81 POST CONCUSSIVE SYNDROME: ICD-10-CM

## 2025-01-23 LAB
ALBUMIN SERPL BCP-MCNC: 4.5 G/DL (ref 3.4–5)
ALP SERPL-CCNC: 77 U/L (ref 33–110)
ALT SERPL W P-5'-P-CCNC: 28 U/L (ref 7–45)
ANION GAP SERPL CALC-SCNC: 17 MMOL/L (ref 10–20)
AST SERPL W P-5'-P-CCNC: 24 U/L (ref 9–39)
BASOPHILS # BLD AUTO: 0.1 X10*3/UL (ref 0–0.1)
BASOPHILS NFR BLD AUTO: 1.1 %
BILIRUB SERPL-MCNC: 0.7 MG/DL (ref 0–1.2)
BUN SERPL-MCNC: 17 MG/DL (ref 6–23)
CALCIUM SERPL-MCNC: 9.7 MG/DL (ref 8.6–10.6)
CHLORIDE SERPL-SCNC: 102 MMOL/L (ref 98–107)
CHOLEST SERPL-MCNC: 276 MG/DL (ref 0–199)
CHOLESTEROL/HDL RATIO: 4.9
CO2 SERPL-SCNC: 25 MMOL/L (ref 21–32)
CREAT SERPL-MCNC: 0.83 MG/DL (ref 0.5–1.05)
EGFRCR SERPLBLD CKD-EPI 2021: 82 ML/MIN/1.73M*2
EOSINOPHIL # BLD AUTO: 0.14 X10*3/UL (ref 0–0.7)
EOSINOPHIL NFR BLD AUTO: 1.6 %
ERYTHROCYTE [DISTWIDTH] IN BLOOD BY AUTOMATED COUNT: 14.1 % (ref 11.5–14.5)
EST. AVERAGE GLUCOSE BLD GHB EST-MCNC: 160 MG/DL
GLUCOSE SERPL-MCNC: 126 MG/DL (ref 74–99)
HBA1C MFR BLD: 7.2 %
HCT VFR BLD AUTO: 43.8 % (ref 36–46)
HDLC SERPL-MCNC: 55.8 MG/DL
HGB BLD-MCNC: 13.4 G/DL (ref 12–16)
IMM GRANULOCYTES # BLD AUTO: 0.08 X10*3/UL (ref 0–0.7)
IMM GRANULOCYTES NFR BLD AUTO: 0.9 % (ref 0–0.9)
LDLC SERPL CALC-MCNC: 190 MG/DL
LYMPHOCYTES # BLD AUTO: 3.21 X10*3/UL (ref 1.2–4.8)
LYMPHOCYTES NFR BLD AUTO: 36.7 %
MCH RBC QN AUTO: 27.2 PG (ref 26–34)
MCHC RBC AUTO-ENTMCNC: 30.6 G/DL (ref 32–36)
MCV RBC AUTO: 89 FL (ref 80–100)
MONOCYTES # BLD AUTO: 0.47 X10*3/UL (ref 0.1–1)
MONOCYTES NFR BLD AUTO: 5.4 %
NEUTROPHILS # BLD AUTO: 4.75 X10*3/UL (ref 1.2–7.7)
NEUTROPHILS NFR BLD AUTO: 54.3 %
NON HDL CHOLESTEROL: 220 MG/DL (ref 0–149)
NRBC BLD-RTO: 0 /100 WBCS (ref 0–0)
PLATELET # BLD AUTO: 391 X10*3/UL (ref 150–450)
POTASSIUM SERPL-SCNC: 4 MMOL/L (ref 3.5–5.3)
PROT SERPL-MCNC: 7.2 G/DL (ref 6.4–8.2)
RBC # BLD AUTO: 4.92 X10*6/UL (ref 4–5.2)
SODIUM SERPL-SCNC: 140 MMOL/L (ref 136–145)
T4 FREE SERPL-MCNC: 1.16 NG/DL (ref 0.78–1.48)
TRIGL SERPL-MCNC: 151 MG/DL (ref 0–149)
TSH SERPL-ACNC: 1.45 MIU/L (ref 0.44–3.98)
VLDL: 30 MG/DL (ref 0–40)
WBC # BLD AUTO: 8.8 X10*3/UL (ref 4.4–11.3)

## 2025-01-23 PROCEDURE — 84443 ASSAY THYROID STIM HORMONE: CPT

## 2025-01-23 PROCEDURE — 85025 COMPLETE CBC W/AUTO DIFF WBC: CPT

## 2025-01-23 PROCEDURE — 80061 LIPID PANEL: CPT

## 2025-01-23 PROCEDURE — 84439 ASSAY OF FREE THYROXINE: CPT

## 2025-01-23 PROCEDURE — 3079F DIAST BP 80-89 MM HG: CPT | Performed by: INTERNAL MEDICINE

## 2025-01-23 PROCEDURE — 3075F SYST BP GE 130 - 139MM HG: CPT | Performed by: INTERNAL MEDICINE

## 2025-01-23 PROCEDURE — 1036F TOBACCO NON-USER: CPT | Performed by: INTERNAL MEDICINE

## 2025-01-23 PROCEDURE — 80053 COMPREHEN METABOLIC PANEL: CPT

## 2025-01-23 PROCEDURE — 99214 OFFICE O/P EST MOD 30 MIN: CPT | Performed by: INTERNAL MEDICINE

## 2025-01-23 PROCEDURE — 83036 HEMOGLOBIN GLYCOSYLATED A1C: CPT

## 2025-01-23 RX ORDER — PANTOPRAZOLE SODIUM 40 MG/1
40 TABLET, DELAYED RELEASE ORAL
Qty: 90 TABLET | Refills: 1 | Status: SHIPPED | OUTPATIENT
Start: 2025-01-23 | End: 2025-04-23

## 2025-01-23 RX ORDER — LOSARTAN POTASSIUM 100 MG/1
100 TABLET ORAL DAILY
Qty: 90 TABLET | Refills: 1 | Status: SHIPPED | OUTPATIENT
Start: 2025-01-23 | End: 2025-04-23

## 2025-01-23 RX ORDER — ROSUVASTATIN CALCIUM 10 MG/1
10 TABLET, COATED ORAL DAILY
Qty: 90 TABLET | Refills: 1 | Status: SHIPPED | OUTPATIENT
Start: 2025-01-23 | End: 2025-04-23

## 2025-01-23 RX ORDER — LOSARTAN POTASSIUM 50 MG/1
100 TABLET ORAL DAILY
Qty: 90 TABLET | Refills: 1 | Status: CANCELLED | OUTPATIENT
Start: 2025-01-23
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
## 2025-01-28 ENCOUNTER — DOCUMENTATION (OUTPATIENT)
Dept: SPEECH THERAPY | Facility: CLINIC | Age: 60
End: 2025-01-28
Payer: COMMERCIAL

## 2025-01-28 DIAGNOSIS — R41.841 COGNITIVE COMMUNICATION DEFICIT: Primary | ICD-10-CM

## 2025-01-28 NOTE — PROGRESS NOTES
Speech-Language Pathology                 Therapy Communication Note    Patient Name: Chanel Vizcarra  MRN: [id number]  Department:   [facility] speech therapy  Today's Date: 1/28/2025     Discipline: Speech Language Pathology    Missed Time: No Show    Comment:  Pt no showed to appointment.  SLP called and spoke with pt.  Pt apologized profusely stating she was looking at the time in her kitchen which must have been wrong as it read 10:50.  (Actual time 11:32).  Pt stated she was just leaving her house and driving to speech.  Due to time, SLP and pt verbally agreed to resume therapy next week.  Chanel stated she was able to get a new planner and was able to be more organized with it.  This therapist is excited to see her developments next week.

## 2025-02-04 ENCOUNTER — TREATMENT (OUTPATIENT)
Dept: SPEECH THERAPY | Facility: CLINIC | Age: 60
End: 2025-02-04
Payer: COMMERCIAL

## 2025-02-04 DIAGNOSIS — R41.841 COGNITIVE COMMUNICATION DEFICIT: Primary | ICD-10-CM

## 2025-02-04 PROCEDURE — 97129 THER IVNTJ 1ST 15 MIN: CPT | Mod: GN | Performed by: SPEECH-LANGUAGE PATHOLOGIST

## 2025-02-04 PROCEDURE — 97130 THER IVNTJ EA ADDL 15 MIN: CPT | Mod: GN | Performed by: SPEECH-LANGUAGE PATHOLOGIST

## 2025-02-04 ASSESSMENT — PAIN - FUNCTIONAL ASSESSMENT: PAIN_FUNCTIONAL_ASSESSMENT: 0-10

## 2025-02-04 ASSESSMENT — PAIN SCALES - GENERAL: PAINLEVEL_OUTOF10: 0 - NO PAIN

## 2025-02-04 NOTE — PROGRESS NOTES
Speech-Language Pathology    Outpatient Speech-Language Pathology Treatment    Patient Name: Chanel Vizcarra  MRN: [id number]  : 1965  Today's Date: 25  Time Calculation  Start Time: 1115  Stop Time: 1200  Time Calculation (min): 45 min        Current Problem:  Cognitive communication deficit    SLP Assessment  Pt arrived on time and participated well.  Pt began session by discussing life stressor with SLP.  SLP offered counseling as able.  Pt stated she started seeing counseling within the last two weeks. Pt reports this seems to be helping.  Pt also reports she has a referral for both OT and PT.  She stated she will be making an appointment with them for an evaluation. SLP made this as part of her HEP.  Chanel verbally agreed.  Pt worked well today and appeared to demonstrate success independently and with min verbal cues.      Plan  SLP Tx Plan:  Continue with POC.   SLP Plan: Skilled SLP  SLP Frequency: 1x/week  Duration: 12 weeks    Subjective  Chanel Vizcarra was pleasant and participated well throughout the session. Seen 1-on-1 with SLP.    General Visit Info  Number of Authorized Treatments : BMN    Total Number of Visits : 2     Insurance Reviewed this date: yes    Pain  Pain Assessment: 0-10   0-10 (Numeric) Pain Score: 0 - No pain    Objective    LTGs:  Pt will increase cognitive linguistic skills to be able to fully participate in daily tasks without deficit at PLOF.Goal established 10/01/2024     STGs    1. Patient will complete divided attention tasks with 80% accuracy independently.Goal established 10/01/2024--Goal progressing/met 2024.  This goal is now met 2024.     2.  Patient will demonstrate an improvement in short term memory abilities by recalling 5-7 pieces of information from a paragraph each session. Goal established 10/01/2024--Goal not targeted /--    3.  Patient will recall 5-7 pieces of information presented in verbal or written form, with/without a delay and  with/without distraction with 90% accuracy when provided with moderate visual and verbal cueing. Goal established 10/01/2024--Goal not targeted 2/4/2025-    4.  Patient will be able to complete high level problem solving tasks (e.g. divergent/convergent tasks, sequencing, reasoning, word deduction, etc.) at 80% with mod cues. Goal established 10/01/2024--Goal progressing  2/4/2025--  Convergent: 88% accuracy independently and increased to 100% accuracy following min verbal cues.    Word deduction: 96% accuracy independently and increased to 100% accuracy following min verbal cues.    5.  Patient will complete executive function tasks (e.g. path finding, prescription labels, etc) with 80% accuracy following min to mod verbal cues.Goal established 10/01/2024--Progressing 2/4/2025  --Pt started logic puzzle and completed puzzle with 80% accuracy following min verbal cues. Pt to complete logic puzzle for HEP.  Pt verbally agreed.    6.  Patient will learn and use memory/cognitive compensatory strategies with return demo with 90% accuracy.Goal established 10/01/2024--Goal progressing/met   1/14/2025.  This goal is now met 12/17/2024    7.  Pt will be use word finding strategies with 80% accuracy following min verbal cues.Goal established 10/01/2024--Not targeted 2/4/2025.--One more session before goal is met.    8.  Patient/Family education to be provided each session.  Goal established 10/01/2024--Goal progressing  2/4/2025.--SLP continued to educate pt on progression of session and HEP.  Pt verbally agreed.    Education  Education was provided to pt/family and reviewed how to carryover strategies learned in session to home.

## 2025-02-06 DIAGNOSIS — E78.5 HYPERLIPIDEMIA, UNSPECIFIED HYPERLIPIDEMIA TYPE: ICD-10-CM

## 2025-02-06 DIAGNOSIS — E11.9 TYPE 2 DIABETES MELLITUS WITHOUT COMPLICATION, UNSPECIFIED WHETHER LONG TERM INSULIN USE (MULTI): Primary | ICD-10-CM

## 2025-02-06 RX ORDER — ROSUVASTATIN CALCIUM 20 MG/1
20 TABLET, COATED ORAL DAILY
Qty: 90 TABLET | Refills: 1 | Status: SHIPPED | OUTPATIENT
Start: 2025-02-06 | End: 2025-05-07

## 2025-02-06 RX ORDER — METFORMIN HYDROCHLORIDE 500 MG/1
500 TABLET ORAL
Qty: 180 TABLET | Refills: 1 | Status: SHIPPED | OUTPATIENT
Start: 2025-02-06 | End: 2025-05-07

## 2025-02-11 ENCOUNTER — TREATMENT (OUTPATIENT)
Dept: SPEECH THERAPY | Facility: CLINIC | Age: 60
End: 2025-02-11
Payer: COMMERCIAL

## 2025-02-11 DIAGNOSIS — R41.841 COGNITIVE COMMUNICATION DEFICIT: Primary | ICD-10-CM

## 2025-02-11 PROCEDURE — 97130 THER IVNTJ EA ADDL 15 MIN: CPT | Mod: GN | Performed by: SPEECH-LANGUAGE PATHOLOGIST

## 2025-02-11 PROCEDURE — 97129 THER IVNTJ 1ST 15 MIN: CPT | Mod: GN | Performed by: SPEECH-LANGUAGE PATHOLOGIST

## 2025-02-11 ASSESSMENT — PAIN - FUNCTIONAL ASSESSMENT: PAIN_FUNCTIONAL_ASSESSMENT: 0-10

## 2025-02-11 ASSESSMENT — PAIN SCALES - GENERAL: PAINLEVEL_OUTOF10: 0 - NO PAIN

## 2025-02-11 NOTE — PROGRESS NOTES
"Speech-Language Pathology    Outpatient Speech-Language Pathology Treatment    Patient Name: Chanel Vizcarra  MRN: [id number]  : 1965  Today's Date: 25  Time Calculation  Start Time: 1115  Stop Time: 1200  Time Calculation (min): 45 min        Current Problem:  Cognitive communication deficit    SLP Assessment  Pt arrived on time and participated well.  Pt jumped right into today's session and remained self motivated. Pt reported \"nothing new\" and denied any pain or medical changes since last session.  Pt stated she did not make her physical therapy appointment but plans to walk down to office to schedule appointment. Pt continues to work towards her cognition goals.  Pt continues to use the memory strategy of visualization to help her with her tasks.      Plan  SLP Tx Plan:  Continue with POC.   SLP Plan: Skilled SLP  SLP Frequency: 1x/week  Duration: 12 weeks    Subjective  Chanel Vizcarra was pleasant and participated well throughout the session. Seen 1-on-1 with SLP.    General Visit Info  Number of Authorized Treatments : BMN    Total Number of Visits : 3     Insurance Reviewed this date: yes    Pain  Pain Assessment: 0-10   0-10 (Numeric) Pain Score: 0 - No pain    Objective    LTGs:  Pt will increase cognitive linguistic skills to be able to fully participate in daily tasks without deficit at PLOF.Goal established 10/01/2024     STGs    1. Patient will complete divided attention tasks with 80% accuracy independently.Goal established 10/01/2024--Goal progressing/met 2024.  This goal is now met 2024.     2.  Patient will demonstrate an improvement in short term memory abilities by recalling 5-7 pieces of information from a paragraph each session. Goal established 10/01/2024--Goal not targeted 2025--    3.  Patient will recall 5-7 pieces of information presented in verbal or written form, with/without a delay and with/without distraction with 90% accuracy when provided with " moderate visual and verbal cueing. Goal established 10/01/2024--Goal not targeted 2/11/2025-85% accuracy following moderate verbal cues.  Pt used memory strategy of visualization to help with recall.    4.  Patient will be able to complete high level problem solving tasks (e.g. divergent/convergent tasks, sequencing, reasoning, word deduction, etc.) at 80% with mod cues. Goal established 10/01/2024--Goal progressing  2/11/2025--  Word deduction with retention: 60% accuracy independently and increased to 100% accuracy following min verbal cues.    5.  Patient will complete executive function tasks (e.g. path finding, prescription labels, etc) with 80% accuracy following min to mod verbal cues.Goal established 10/01/2024--Progressing 2/11/2025  --Pt started logic puzzle and completed puzzle with 100% accuracy following min verbal cues. Great working.      Completed ronnie task with 70% accuracy following min verbal cues.    6.  Patient will learn and use memory/cognitive compensatory strategies with return demo with 90% accuracy.Goal established 10/01/2024--Goal progressing/met   1/14/2025.  This goal is now met 12/17/2024    7.  Pt will be use word finding strategies with 80% accuracy following min verbal cues.Goal established 10/01/2024--Not targeted 2/11/2025.--One more session before goal is met.    8.  Patient/Family education to be provided each session.  Goal established 10/01/2024--Goal progressing  2/11/2025.--SLP continued to educate pt on progression of session and HEP.  Pt verbally agreed.    Education  Education was provided to pt/family and reviewed how to carryover strategies learned in session to home.

## 2025-02-18 ENCOUNTER — TREATMENT (OUTPATIENT)
Dept: SPEECH THERAPY | Facility: CLINIC | Age: 60
End: 2025-02-18
Payer: COMMERCIAL

## 2025-02-18 DIAGNOSIS — R41.841 COGNITIVE COMMUNICATION DEFICIT: Primary | ICD-10-CM

## 2025-02-18 PROCEDURE — 97130 THER IVNTJ EA ADDL 15 MIN: CPT | Mod: GN | Performed by: SPEECH-LANGUAGE PATHOLOGIST

## 2025-02-18 PROCEDURE — 97129 THER IVNTJ 1ST 15 MIN: CPT | Mod: GN | Performed by: SPEECH-LANGUAGE PATHOLOGIST

## 2025-02-18 ASSESSMENT — PAIN - FUNCTIONAL ASSESSMENT: PAIN_FUNCTIONAL_ASSESSMENT: 0-10

## 2025-02-18 ASSESSMENT — PAIN SCALES - GENERAL: PAINLEVEL_OUTOF10: 0 - NO PAIN

## 2025-02-18 NOTE — PROGRESS NOTES
Speech-Language Pathology    Outpatient Speech-Language Pathology Treatment    Patient Name: Chanel Vizcarra  MRN: [id number]  : 1965  Today's Date: 25  Time Calculation  Start Time: 1115  Stop Time: 1200  Time Calculation (min): 45 min        Current Problem:  Cognitive communication deficit    SLP Assessment  Pt arrived on time and participated well.  Pt reported she made an appointment for PT after last week's appointment.  She stated the evaluation is sometime in March.  She also shared that she is continues to see counselor which appears to be helping.  Pt continues to work towards her cognition goals.  Pt commented that she is able to focus and work well in speech. However, she demonstrates more difficulty at home where there are additional stressors.  SLP and pt reviewed her compensatory strategies and SLP encouraged her to use them at home.  Pt verbally agreed.      Plan  SLP Tx Plan:  Continue with POC.   SLP Plan: Skilled SLP  SLP Frequency: 1x/week  Duration: 12 weeks    Subjective  Chanel Vizcarra was pleasant and participated well throughout the session. Seen 1-on-1 with SLP.    General Visit Info  Number of Authorized Treatments : BMN    Total Number of Visits : 4     Insurance Reviewed this date: yes    Pain  Pain Assessment: 0-10   0-10 (Numeric) Pain Score: 0 - No pain    Objective    LTGs:  Pt will increase cognitive linguistic skills to be able to fully participate in daily tasks without deficit at PLOF.Goal established 10/01/2024     STGs    1. Patient will complete divided attention tasks with 80% accuracy independently.Goal established 10/01/2024--Goal progressing/met 2024.  This goal is now met 2024.     2.  Patient will demonstrate an improvement in short term memory abilities by recalling 5-7 pieces of information from a paragraph each session. Goal established 10/01/2024--Goal not targeted 2025--    3.  Patient will recall 5-7 pieces of information presented  in verbal or written form, with/without a delay and with/without distraction with 90% accuracy when provided with moderate visual and verbal cueing. Goal established 10/01/2024--Goal not targeted 2/18/2025-85% accuracy following moderate verbal cues.  Pt used memory strategy of visualization to help with recall.    4.  Patient will be able to complete high level problem solving tasks (e.g. divergent/convergent tasks, sequencing, reasoning, word deduction, etc.) at 80% with mod cues. Goal established 10/01/2024--Goal progressing  2/18/2025--  Word deduction with retention: 80% accuracy following min verbal cues.  Great working!  Two more sessions before goal is met.    Word deduction with timer: 60% accuracy independently.     5.  Patient will complete executive function tasks (e.g. path finding, prescription labels, etc) with 80% accuracy following min to mod verbal cues.Goal established 10/01/2024--Progressing 2/18/2025  --Pt started logic puzzle and completed puzzle with 100% accuracy following min verbal cues. This is consistent with last session.    6.  Patient will learn and use memory/cognitive compensatory strategies with return demo with 90% accuracy.Goal established 10/01/2024--Goal progressing/met   1/14/2025.  This goal is now met 12/17/2024    7.  Pt will be use word finding strategies with 80% accuracy following min verbal cues.Goal established 10/01/2024--Not targeted 2/18/2025.--One more session before goal is met.    8.  Patient/Family education to be provided each session.  Goal established 10/01/2024--Goal progressing  2/18/2025.--SLP continued to educate pt on progression of session and HEP.  Pt verbally agreed.    Education  Education was provided to pt/family and reviewed how to carryover strategies learned in session to home.

## 2025-02-25 ENCOUNTER — APPOINTMENT (OUTPATIENT)
Dept: SPEECH THERAPY | Facility: CLINIC | Age: 60
End: 2025-02-25
Payer: COMMERCIAL

## 2025-02-25 DIAGNOSIS — R41.841 COGNITIVE COMMUNICATION DEFICIT: Primary | ICD-10-CM

## 2025-03-04 ENCOUNTER — TREATMENT (OUTPATIENT)
Dept: SPEECH THERAPY | Facility: CLINIC | Age: 60
End: 2025-03-04
Payer: COMMERCIAL

## 2025-03-04 DIAGNOSIS — R41.841 COGNITIVE COMMUNICATION DEFICIT: Primary | ICD-10-CM

## 2025-03-04 PROCEDURE — 97129 THER IVNTJ 1ST 15 MIN: CPT | Mod: GN | Performed by: SPEECH-LANGUAGE PATHOLOGIST

## 2025-03-04 PROCEDURE — 97130 THER IVNTJ EA ADDL 15 MIN: CPT | Mod: GN | Performed by: SPEECH-LANGUAGE PATHOLOGIST

## 2025-03-04 ASSESSMENT — PAIN SCALES - GENERAL: PAINLEVEL_OUTOF10: 0 - NO PAIN

## 2025-03-04 ASSESSMENT — PAIN - FUNCTIONAL ASSESSMENT: PAIN_FUNCTIONAL_ASSESSMENT: 0-10

## 2025-03-04 NOTE — PROGRESS NOTES
Speech-Language Pathology    Outpatient Speech-Language Pathology Treatment    Patient Name: Chanel Vizcarra  MRN: [id number]  : 1965  Today's Date: 25  Time Calculation  Start Time: 1115  Stop Time: 1200  Time Calculation (min): 45 min        Current Problem:  Cognitive communication deficit    SLP Assessment  Pt arrived on time and participated well.  Pt reports she is doing well but has a headache this date.  She stated she continues to see her counselor, attend a meditation class, and working out which appears to help her overall mood.   Pt also reported she will have her PT evaluation in two days on 2025.  SLP continues to encourage pt to use her compensatory strategies at home.  Pt verbally agreed.    Plan  SLP Tx Plan:  Continue with POC.   SLP Plan: Skilled SLP  SLP Frequency: 1x/week  Duration: 12 weeks    Subjective  Chanel Vizcarra was pleasant and participated well throughout the session. Seen 1-on-1 with SLP.    General Visit Info  Number of Authorized Treatments : BMN    Total Number of Visits : 5     Insurance Reviewed this date: yes    Pain  Pain Assessment: 0-10   0-10 (Numeric) Pain Score: 0 - No pain    Objective    LTGs:  Pt will increase cognitive linguistic skills to be able to fully participate in daily tasks without deficit at PLOF.Goal established 10/01/2024     STGs    1. Patient will complete divided attention tasks with 80% accuracy independently.Goal established 10/01/2024--Goal progressing/met 2024.  This goal is now met 2024.     2.  Patient will demonstrate an improvement in short term memory abilities by recalling 5-7 pieces of information from a paragraph each session. Goal established 10/01/2024--Goal not targeted 3/4/2025--87% accuracy (with distraction present) independently or following min verbal cues.  Two more sessions before goal is met.    3.  Patient will recall 5-7 pieces of information presented in verbal or written form, with/without  a delay and with/without distraction with 90% accuracy when provided with moderate visual and verbal cueing. Goal established 10/01/2024--Goal not targeted [id number]    4.  Patient will be able to complete high level problem solving tasks (e.g. divergent/convergent tasks, sequencing, reasoning, word deduction, etc.) at 80% with mod cues. Goal established 10/01/2024--Not targeted 3/4/2025--  Word deduction Two more sessions before goal is met.    5.  Patient will complete executive function tasks (e.g. path finding, prescription labels, etc) with 80% accuracy following min to mod verbal cues.Goal established 10/01/2024--Progressing 3/4/2025  -75% accuracy following min verbal cues.  Required moderate verbal cues  to increase to 100% accuracy.     6.  Patient will learn and use memory/cognitive compensatory strategies with return demo with 90% accuracy.Goal established 10/01/2024--Goal progressing/met   1/14/2025.  This goal is now met 12/17/2024    7.  Pt will be use word finding strategies with 80% accuracy following min verbal cues.Goal established 10/01/2024--Progressing/met 3/4/2025.--Completed word finding strategy with 100% accuracy independently.  This is the third consecutive session in which she has met all requirements for this goal.  This goal is now met 3/4/2025.    8.  Patient/Family education to be provided each session.  Goal established 10/01/2024--Goal progressing  3/4/2025.--SLP educated pt on scheduling 6 additional session with potential discharge at the end of the six sessions. Pt verbally agreed.     Education  Education was provided to pt/family and reviewed how to carryover strategies learned in session to home.

## 2025-03-06 ENCOUNTER — DOCUMENTATION (OUTPATIENT)
Dept: PHYSICAL THERAPY | Facility: CLINIC | Age: 60
End: 2025-03-06
Payer: COMMERCIAL

## 2025-03-06 NOTE — PROGRESS NOTES
Physical Therapy                     Therapy Communication Note    Patient Name: Chanel Vizcarra  MRN: [id number]  Today's Date: 3/6/2025     Discipline: Physical Therapy    Missed Time: No Show

## 2025-03-11 ENCOUNTER — DOCUMENTATION (OUTPATIENT)
Dept: SPEECH THERAPY | Facility: CLINIC | Age: 60
End: 2025-03-11
Payer: COMMERCIAL

## 2025-03-11 DIAGNOSIS — R41.841 COGNITIVE COMMUNICATION DEFICIT: Primary | ICD-10-CM

## 2025-03-11 NOTE — PROGRESS NOTES
Speech-Language Pathology                 Therapy Communication Note    Patient Name: Chanel Vizcarra  MRN: [id number]  Department:   [facility] speech therapy  Today's Date: 3/11/2025     Discipline: Speech Language Pathology    Missed Time: No Show    Comment: Pt no-showed to appointment.

## 2025-03-18 ENCOUNTER — DOCUMENTATION (OUTPATIENT)
Dept: SPEECH THERAPY | Facility: CLINIC | Age: 60
End: 2025-03-18
Payer: COMMERCIAL

## 2025-03-18 DIAGNOSIS — R41.841 COGNITIVE COMMUNICATION DEFICIT: Primary | ICD-10-CM

## 2025-03-18 NOTE — PROGRESS NOTES
Speech-Language Pathology                 Therapy Communication Note    Patient Name: Chanel Vizcarra  MRN: [id number]  Department:   OP speech therapy  Today's Date: 3/18/2025     Discipline: Speech Language Pathology    Missed Time: No Show    Comment: Pt no showed to appointment. SLP attempted to call 2x to follow up with pt.  However, mail box was not set up for voicemail.  Unable to leave message.

## 2025-03-25 ENCOUNTER — DOCUMENTATION (OUTPATIENT)
Dept: SPEECH THERAPY | Facility: CLINIC | Age: 60
End: 2025-03-25
Payer: COMMERCIAL

## 2025-03-25 ENCOUNTER — APPOINTMENT (OUTPATIENT)
Dept: SPEECH THERAPY | Facility: CLINIC | Age: 60
End: 2025-03-25
Payer: COMMERCIAL

## 2025-03-25 DIAGNOSIS — R41.841 COGNITIVE COMMUNICATION DEFICIT: Primary | ICD-10-CM

## 2025-03-25 NOTE — PROGRESS NOTES
Speech-Language Pathology                 Therapy Communication Note    Patient Name: Chanel Vizcarra  MRN: [id number]  Department:   [facility] speech therapy  Today's Date: 3/25/2025     Discipline: Speech Language Pathology    Missed Time: Cancel    Comment: SLP called and spoke with pt.  Pt apologized stating she got confused with her appointment times.  SLP confirmed next appointment.  Pt verbally agreed.  [place] mailed out future appointments.

## 2025-04-01 ENCOUNTER — DOCUMENTATION (OUTPATIENT)
Dept: SPEECH THERAPY | Facility: CLINIC | Age: 60
End: 2025-04-01
Payer: COMMERCIAL

## 2025-04-01 ENCOUNTER — APPOINTMENT (OUTPATIENT)
Dept: SPEECH THERAPY | Facility: CLINIC | Age: 60
End: 2025-04-01
Payer: COMMERCIAL

## 2025-04-01 DIAGNOSIS — R41.841 COGNITIVE COMMUNICATION DEFICIT: Primary | ICD-10-CM

## 2025-04-01 NOTE — PROGRESS NOTES
Speech-Language Pathology                 Therapy Communication Note    Patient Name: Chanel Vizcarra  MRN: [id number]  Department:   [facility] speech therapy  Today's Date: 4/1/2025     Discipline: Speech Language Pathology    Missed Time: Cancel    Comment:  Pt called to cancel appointment.  She has to reports she needs to take her son to Urgent Care.  Therapy to resume at next schedule appointment.

## 2025-04-08 ENCOUNTER — DOCUMENTATION (OUTPATIENT)
Dept: SPEECH THERAPY | Facility: CLINIC | Age: 60
End: 2025-04-08
Payer: COMMERCIAL

## 2025-04-08 DIAGNOSIS — R41.841 COGNITIVE COMMUNICATION DEFICIT: Primary | ICD-10-CM

## 2025-04-08 NOTE — PROGRESS NOTES
Speech-Language Pathology                 Therapy Communication Note    Patient Name: Chanel Vizcarra  MRN: [id number]  Department:   [facility] speech therapy  Today's Date: 4/8/2025     Discipline: Speech Language Pathology    Missed Time: No Show    Comment: Pt no showed to appointment.  SLP attempted to call 2x without success.  Phone is not accepting voicemail.

## 2025-04-15 ENCOUNTER — APPOINTMENT (OUTPATIENT)
Dept: SPEECH THERAPY | Facility: CLINIC | Age: 60
End: 2025-04-15
Payer: COMMERCIAL

## 2025-04-15 ENCOUNTER — APPOINTMENT (OUTPATIENT)
Dept: CARDIOLOGY | Facility: HOSPITAL | Age: 60
End: 2025-04-15
Payer: COMMERCIAL

## 2025-04-15 ENCOUNTER — HOSPITAL ENCOUNTER (EMERGENCY)
Facility: HOSPITAL | Age: 60
Discharge: HOME | End: 2025-04-15
Attending: STUDENT IN AN ORGANIZED HEALTH CARE EDUCATION/TRAINING PROGRAM
Payer: COMMERCIAL

## 2025-04-15 ENCOUNTER — APPOINTMENT (OUTPATIENT)
Dept: RADIOLOGY | Facility: HOSPITAL | Age: 60
End: 2025-04-15
Payer: COMMERCIAL

## 2025-04-15 VITALS
HEART RATE: 79 BPM | HEIGHT: 60 IN | OXYGEN SATURATION: 96 % | DIASTOLIC BLOOD PRESSURE: 98 MMHG | WEIGHT: 220 LBS | BODY MASS INDEX: 43.19 KG/M2 | TEMPERATURE: 98.2 F | RESPIRATION RATE: 18 BRPM | SYSTOLIC BLOOD PRESSURE: 174 MMHG

## 2025-04-15 DIAGNOSIS — R07.9 CHEST PAIN, UNSPECIFIED TYPE: Primary | ICD-10-CM

## 2025-04-15 DIAGNOSIS — R41.841 COGNITIVE COMMUNICATION DEFICIT: Primary | ICD-10-CM

## 2025-04-15 LAB
ALBUMIN SERPL BCP-MCNC: 4.3 G/DL (ref 3.4–5)
ALP SERPL-CCNC: 79 U/L (ref 33–110)
ALT SERPL W P-5'-P-CCNC: 29 U/L (ref 7–45)
ANION GAP SERPL CALC-SCNC: 13 MMOL/L (ref 10–20)
AST SERPL W P-5'-P-CCNC: 20 U/L (ref 9–39)
BASOPHILS # BLD AUTO: 0.05 X10*3/UL (ref 0–0.1)
BASOPHILS NFR BLD AUTO: 0.6 %
BILIRUB SERPL-MCNC: 0.4 MG/DL (ref 0–1.2)
BUN SERPL-MCNC: 16 MG/DL (ref 6–23)
CALCIUM SERPL-MCNC: 9.1 MG/DL (ref 8.6–10.3)
CARDIAC TROPONIN I PNL SERPL HS: 8 NG/L (ref 0–13)
CHLORIDE SERPL-SCNC: 104 MMOL/L (ref 98–107)
CO2 SERPL-SCNC: 26 MMOL/L (ref 21–32)
CREAT SERPL-MCNC: 1.01 MG/DL (ref 0.5–1.05)
EGFRCR SERPLBLD CKD-EPI 2021: 65 ML/MIN/1.73M*2
EOSINOPHIL # BLD AUTO: 0.18 X10*3/UL (ref 0–0.7)
EOSINOPHIL NFR BLD AUTO: 2 %
ERYTHROCYTE [DISTWIDTH] IN BLOOD BY AUTOMATED COUNT: 14.3 % (ref 11.5–14.5)
GLUCOSE SERPL-MCNC: 126 MG/DL (ref 74–99)
HCT VFR BLD AUTO: 43.1 % (ref 36–46)
HGB BLD-MCNC: 13.1 G/DL (ref 12–16)
IMM GRANULOCYTES # BLD AUTO: 0.06 X10*3/UL (ref 0–0.7)
IMM GRANULOCYTES NFR BLD AUTO: 0.7 % (ref 0–0.9)
LYMPHOCYTES # BLD AUTO: 3.36 X10*3/UL (ref 1.2–4.8)
LYMPHOCYTES NFR BLD AUTO: 37.7 %
MCH RBC QN AUTO: 27.2 PG (ref 26–34)
MCHC RBC AUTO-ENTMCNC: 30.4 G/DL (ref 32–36)
MCV RBC AUTO: 90 FL (ref 80–100)
MONOCYTES # BLD AUTO: 0.51 X10*3/UL (ref 0.1–1)
MONOCYTES NFR BLD AUTO: 5.7 %
NEUTROPHILS # BLD AUTO: 4.76 X10*3/UL (ref 1.2–7.7)
NEUTROPHILS NFR BLD AUTO: 53.3 %
NRBC BLD-RTO: 0 /100 WBCS (ref 0–0)
PLATELET # BLD AUTO: 371 X10*3/UL (ref 150–450)
POTASSIUM SERPL-SCNC: 3.7 MMOL/L (ref 3.5–5.3)
PROT SERPL-MCNC: 7.2 G/DL (ref 6.4–8.2)
RBC # BLD AUTO: 4.81 X10*6/UL (ref 4–5.2)
SODIUM SERPL-SCNC: 139 MMOL/L (ref 136–145)
WBC # BLD AUTO: 8.9 X10*3/UL (ref 4.4–11.3)

## 2025-04-15 PROCEDURE — 71046 X-RAY EXAM CHEST 2 VIEWS: CPT | Performed by: RADIOLOGY

## 2025-04-15 PROCEDURE — 93005 ELECTROCARDIOGRAM TRACING: CPT

## 2025-04-15 PROCEDURE — 36415 COLL VENOUS BLD VENIPUNCTURE: CPT | Performed by: PHYSICIAN ASSISTANT

## 2025-04-15 PROCEDURE — 85025 COMPLETE CBC W/AUTO DIFF WBC: CPT | Performed by: PHYSICIAN ASSISTANT

## 2025-04-15 PROCEDURE — 71046 X-RAY EXAM CHEST 2 VIEWS: CPT

## 2025-04-15 PROCEDURE — 99285 EMERGENCY DEPT VISIT HI MDM: CPT | Mod: 25 | Performed by: STUDENT IN AN ORGANIZED HEALTH CARE EDUCATION/TRAINING PROGRAM

## 2025-04-15 PROCEDURE — 80053 COMPREHEN METABOLIC PANEL: CPT | Performed by: PHYSICIAN ASSISTANT

## 2025-04-15 PROCEDURE — 84484 ASSAY OF TROPONIN QUANT: CPT | Performed by: PHYSICIAN ASSISTANT

## 2025-04-15 ASSESSMENT — LIFESTYLE VARIABLES
TOTAL SCORE: 0
HAVE PEOPLE ANNOYED YOU BY CRITICIZING YOUR DRINKING: NO
EVER HAD A DRINK FIRST THING IN THE MORNING TO STEADY YOUR NERVES TO GET RID OF A HANGOVER: NO
HAVE YOU EVER FELT YOU SHOULD CUT DOWN ON YOUR DRINKING: NO
EVER FELT BAD OR GUILTY ABOUT YOUR DRINKING: NO

## 2025-04-15 ASSESSMENT — PAIN SCALES - GENERAL: PAINLEVEL_OUTOF10: 5 - MODERATE PAIN

## 2025-04-15 NOTE — ED TRIAGE NOTES
The patient was seen and examined in triage.    History of Present Illness: The patient is a 59-year-old female presents emergency department due to left-sided chest pain for 2 days.  She reports that it feels like a dull burning sensation.  It does not radiate.  She has not had associated sweats, nausea, vomiting, shortness of breath.  She reports that she has been walking on the treadmill and has not been having any chest pains.  She reports that she is seeing cardiology and had normal stress test and echocardiogram.  She is concerned because her mother had coronary artery disease.  She denies smoking cigarettes.  Denies recent travel, recent surgery, recent immobilization.  She has no further complaints.    Brief Physical Exam:  Exam is limited by the patient sitting in a chair in triage.   Heart: Regular rate and rhythm.   Lungs: Clear to auscultation bilaterally.   Abdomen: Soft, nondistended, nontender     Plan: Appropriate labs and diagnostic imaging were ordered.      For the remainder of the patient's workup and ED course, please refer to the main ED provider note. We discussed need for diagnostic testing including laboratory studies and imaging.  We also discussed that they may be asked to wait in the waiting room while these tests are pending.  They understand that if they choose to leave without having the testing completed or resulted that we cannot rule out acute life threatening illnesses and the risks involved could lead to worsening condition, permanent disability or even death.      Disclaimer: This note was dictated by speech recognition. Minor errors in transcription may be present. Please call if questions.

## 2025-04-15 NOTE — ED TRIAGE NOTES
PT PRESENTS TO ED FROM HOME VIA PRIVATE AUTO FOR INTERMITTENT CHEST PAIN FOR 2 DAYS. ENDORSES DULL/ BURNING PAIN. DENIES RADIATION. DENIES SOB.

## 2025-04-16 LAB
ATRIAL RATE: 103 BPM
P AXIS: 59 DEGREES
P OFFSET: 197 MS
P ONSET: 145 MS
PR INTERVAL: 150 MS
Q ONSET: 220 MS
QRS COUNT: 17 BEATS
QRS DURATION: 88 MS
QT INTERVAL: 362 MS
QTC CALCULATION(BAZETT): 474 MS
QTC FREDERICIA: 433 MS
R AXIS: 113 DEGREES
T AXIS: 8 DEGREES
T OFFSET: 401 MS
VENTRICULAR RATE: 103 BPM

## 2025-04-16 NOTE — ED PROVIDER NOTES
History of Present Illness     Chief Complaint   Patient presents with    Chest Pain       History provided by: Patient and Family Member  Limitations to History: None    HPI:  Chanel Vizcarra is a 59 y.o. female presenting for evaluation of left-sided chest pain dull in nature, nonradiating, intermittent, spontaneously resolves happened 3 times over the past 2 days.  She did not have any associated shortness of breath, palpitations, lightheadedness, nausea, vomiting.  She is otherwise been feeling well, she started going to the gym more regularly 2 months ago when her hemoglobin A1c came back at 7 and they offered her medical management versus metformin, she did not want to be started on medications.  She is otherwise not had any infectious symptoms, trauma, no long travel, not on exogenous estrogen use.    PMH: HTN, HLD  Meds: Losartan, rosuvastatin  Allg: No known medication allergies  Social: She has been significantly stressed over family issues    Physical Exam   Triage vitals:  T 36.6 °C (97.9 °F)  HR    BP (!) 175/110  RR 18  O2 98 % None (Room air)    Constitutional: Awake, alert, and answering questions appropriately. Non toxic appearing.   Eyes: Pupils equally round and reactive to light, sclera normal, extraocular movements intact  Mouth: Mucus membranes moist, no tonsillar changes  Neck: Full range of motion  Cardiovascular: Regular rate and rhythm without murmurs, Pulses equal throughout, no lower extremity edema  Respiratory: Breathing comfortably, Lungs clear to auscultation throughout, no focal wheeze, crackles, or rales  Skin: No rashes  Neuro: Alert and oriented x3, no focal findings  Psych: Appropriate mood     ED Course     ED Course as of 04/15/25 2017   Tue Apr 15, 2025   1937 XR chest 2 views  I independently reviewed and interpreted chest XR. There are no focal consolidations, effusions, pneumothorax, or other acute findings. ' [KV]   1937 ECG 12 lead  I independently reviewed the12 lead  ECG completed at 1637 showing sinus tachycardia at a rate of 103 bpm. Axis is right deviated. R wave progressions across precordium is normal. There are no ST elevations/t wave inversions. NM, QRS, and QT intervals are appropriate.  When compared to previous ECG in May 2024 patient did not have right deviation at that time. [KV]      ED Course User Index  [KV] Dahlia Anglin MD         Diagnoses as of 04/15/25 2017   Chest pain, unspecified type       Procedures   Procedures    Medical Decision Making   Medical Decision Makin y.o. female presenting for evaluation of left-sided chest pain spontaneously resolved, will rule out ACS given her risk factors, however unlikely given the story.    CMP does not have any creatinine changes or electrolyte abnormalities, CBC without any leukocytosis or anemia, high-sensitivity troponin negative and only requires 1 secondary to the onset being 2 days ago and her not having any active chest pain here.    Chest x-ray does not have any findings of pneumothorax, pneumonia, effusion or other acute process.  ECG without ischemic findings.    I had a long conversation with the patient regarding her family stressors, this is likely an etiology of her chest pain today and does not require any further workup from the inpatient setting.  She last had a treadmill stress test in , encouraged her to follow-up with her primary care physician to see if repeat stress test is needed at this time, she states she will have cardiology referral through her primary care physician though she is offered cardiology referral here today.  Patient feels comfortable going home and understands return precautions.  ----            Disposition   Discharged with strict return precautions and PCP follow-up    Dahlia Anglin MD  Emergency Medicine       Dahlia Anglin MD  04/15/25 2020

## 2025-04-22 ENCOUNTER — APPOINTMENT (OUTPATIENT)
Dept: SPEECH THERAPY | Facility: CLINIC | Age: 60
End: 2025-04-22
Payer: COMMERCIAL

## 2025-04-22 DIAGNOSIS — R41.841 COGNITIVE COMMUNICATION DEFICIT: Primary | ICD-10-CM

## 2025-04-24 ENCOUNTER — APPOINTMENT (OUTPATIENT)
Dept: PRIMARY CARE | Facility: CLINIC | Age: 60
End: 2025-04-24
Payer: COMMERCIAL

## 2025-04-29 ENCOUNTER — APPOINTMENT (OUTPATIENT)
Dept: SPEECH THERAPY | Facility: CLINIC | Age: 60
End: 2025-04-29
Payer: COMMERCIAL

## 2025-04-29 DIAGNOSIS — R41.841 COGNITIVE COMMUNICATION DEFICIT: Primary | ICD-10-CM

## 2025-05-02 ENCOUNTER — APPOINTMENT (OUTPATIENT)
Dept: PRIMARY CARE | Facility: CLINIC | Age: 60
End: 2025-05-02
Payer: COMMERCIAL

## 2025-05-13 ENCOUNTER — APPOINTMENT (OUTPATIENT)
Dept: SPEECH THERAPY | Facility: CLINIC | Age: 60
End: 2025-05-13
Payer: COMMERCIAL

## 2025-05-13 ENCOUNTER — DOCUMENTATION (OUTPATIENT)
Dept: SPEECH THERAPY | Facility: CLINIC | Age: 60
End: 2025-05-13
Payer: COMMERCIAL

## 2025-05-13 DIAGNOSIS — R41.841 COGNITIVE COMMUNICATION DEFICIT: Primary | ICD-10-CM

## 2025-05-13 NOTE — PROGRESS NOTES
Speech-Language Pathology    Discharge Summary    Name: Chanel Vizcarra  MRN: [id number]  : 1965  Date: 25    Discharge Summary: SLP    Discharge Information: Date of discharge , Date of last visit 3/4/2025, Date of evaluation 10/01/2024, Number of attended visits 13, Referred by Juve Quiroga DO, and Referred for Speech disturbance, unspecified.    Therapy Summary:   Below is the pt's progress from her last therapy session on 3/4/2025.    1. Patient will complete divided attention tasks with 80% accuracy independently.Goal established 10/01/2024--Goal progressing/met 2024.  This goal is now met 2024.     2.  Patient will demonstrate an improvement in short term memory abilities by recalling 5-7 pieces of information from a paragraph each session. Goal established 10/01/2024--Goal not targeted 3/4/2025--87% accuracy (with distraction present) independently or following min verbal cues.  Two more sessions before goal is met.     3.  Patient will recall 5-7 pieces of information presented in verbal or written form, with/without a delay and with/without distraction with 90% accuracy when provided with moderate visual and verbal cueing. Goal established 10/01/2024--Goal not targeted 3/4/2025     4.  Patient will be able to complete high level problem solving tasks (e.g. divergent/convergent tasks, sequencing, reasoning, word deduction, etc.) at 80% with mod cues. Goal established 10/01/2024--Not targeted 3/4/2025--  Word deduction Two more sessions before goal is met.     5.  Patient will complete executive function tasks (e.g. path finding, prescription labels, etc) with 80% accuracy following min to mod verbal cues.Goal established 10/01/2024--Progressing 3/4/2025  -75% accuracy following min verbal cues.  Required moderate verbal cues  to increase to 100% accuracy.      6.  Patient will learn and use memory/cognitive compensatory strategies with return demo with 90% accuracy.Goal  established 10/01/2024--Goal progressing/met   1/14/2025.  This goal is now met 12/17/2024     7.  Pt will be use word finding strategies with 80% accuracy following min verbal cues.Goal established 10/01/2024--Progressing/met 3/4/2025.--Completed word finding strategy with 100% accuracy independently.  This is the third consecutive session in which she has met all requirements for this goal.  This goal is now met 3/4/2025.     8.  Patient/Family education to be provided each session.  Goal established 10/01/2024--Goal progressing  3/4/2025.--SLP educated pt on scheduling 6 additional session with potential discharge at the end of the six sessions. Pt verbally agreed.     Discharge Status: Pt is now discharged.    Rehab Discharge Reason: Failed to schedule and/or keep follow-up appointment(s)

## 2025-05-19 ENCOUNTER — TELEPHONE (OUTPATIENT)
Dept: PRIMARY CARE | Facility: CLINIC | Age: 60
End: 2025-05-19
Payer: COMMERCIAL

## 2025-07-02 ENCOUNTER — APPOINTMENT (OUTPATIENT)
Dept: PRIMARY CARE | Facility: CLINIC | Age: 60
End: 2025-07-02
Payer: COMMERCIAL

## 2025-07-02 PROCEDURE — NOSHO NO SHOW VISIT: Performed by: INTERNAL MEDICINE
